# Patient Record
Sex: FEMALE | Race: BLACK OR AFRICAN AMERICAN | Employment: OTHER | URBAN - METROPOLITAN AREA
[De-identification: names, ages, dates, MRNs, and addresses within clinical notes are randomized per-mention and may not be internally consistent; named-entity substitution may affect disease eponyms.]

---

## 2017-01-17 ENCOUNTER — TELEPHONE (OUTPATIENT)
Dept: FAMILY MEDICINE CLINIC | Facility: CLINIC | Age: 57
End: 2017-01-17

## 2017-01-17 NOTE — TELEPHONE ENCOUNTER
Pt called and said she is having severe stomach issues, she would like to come in Friday mornign is possible with Dr. Patel Course.

## 2017-01-17 NOTE — TELEPHONE ENCOUNTER
Future Appointments  Date Time Provider Kane Molina   1/19/2017 10:00 AM Agatha Gambino,  [facility]

## 2017-01-17 NOTE — TELEPHONE ENCOUNTER
Patient states she is experiencing pain in her lower abdomen. She feels that this is related to the mesh used when she had hernia repair.   She states that she is having normal BM,some nausea but no vomiting  She only wants to see Dr Lisa Benjamin and she is hop

## 2017-01-19 ENCOUNTER — OFFICE VISIT (OUTPATIENT)
Dept: FAMILY MEDICINE CLINIC | Facility: CLINIC | Age: 57
End: 2017-01-19

## 2017-01-19 VITALS
SYSTOLIC BLOOD PRESSURE: 130 MMHG | HEART RATE: 78 BPM | BODY MASS INDEX: 34.45 KG/M2 | DIASTOLIC BLOOD PRESSURE: 76 MMHG | TEMPERATURE: 99 F | WEIGHT: 187.19 LBS | HEIGHT: 61.81 IN

## 2017-01-19 DIAGNOSIS — R10.829 REBOUND ABDOMINAL TENDERNESS: ICD-10-CM

## 2017-01-19 DIAGNOSIS — R19.8: ICD-10-CM

## 2017-01-19 DIAGNOSIS — R10.31 RIGHT LOWER QUADRANT ABDOMINAL PAIN: Primary | ICD-10-CM

## 2017-01-19 DIAGNOSIS — R11.0 NAUSEA: ICD-10-CM

## 2017-01-19 DIAGNOSIS — R10.30 ABDOMINAL WALL PAIN IN SUPRAPUBIC REGION: ICD-10-CM

## 2017-01-19 LAB
MULTISTIX LOT#: NORMAL NUMERIC
PH, URINE: 5.5 (ref 4.5–8)
SPECIFIC GRAVITY: 1.02 (ref 1–1.03)
UROBILINOGEN,SEMI-QN: 0.2 MG/DL (ref 0–1.9)

## 2017-01-19 PROCEDURE — 99214 OFFICE O/P EST MOD 30 MIN: CPT | Performed by: FAMILY MEDICINE

## 2017-01-19 PROCEDURE — 81003 URINALYSIS AUTO W/O SCOPE: CPT | Performed by: FAMILY MEDICINE

## 2017-01-19 NOTE — PROGRESS NOTES
Pratik Smith is a 61year old female. HPI:         Pelvic pain: points to suprapubic area for 4-5 days, 6 days at the most.  Marsha Stack is like a stabbing sensation. Associated with nausea but no vomiting. Started in the [place]", then moved down.   Ne 2/9/2013     possibly from Celebrex, see above    • History of hernia repair 4/29/2013   • Osteopenia 8/28/2013   • Osteoarthritis of right hip 8/28/2013   • Smoking addiction 9/15/2013   • Paresthesia and pain of both upper extremities 3/8/2014   • Histor kg/(m^2) as calculated from the following:    Height as of this encounter: 61.81\". Weight as of this encounter: 187 lb 3.2 oz. Physical Exam   Nursing note and vitals reviewed. Constitutional: She is oriented to person, place, and time.  She appears lower quadrant abdominal pain  Etiology uncertain, diverticulitis vs appendicitis vs other   - Urine Dip, auto without Micro  - CT ABDOMEN+PELVIS(CONTRAST ONLY)(CPT=74177); Future    2.  Abdominal wall pain in suprapubic region  As above in #1  - Urine Dip,

## 2017-01-24 ENCOUNTER — TELEPHONE (OUTPATIENT)
Dept: FAMILY MEDICINE CLINIC | Facility: CLINIC | Age: 57
End: 2017-01-24

## 2017-01-24 PROBLEM — R10.31 RIGHT LOWER QUADRANT ABDOMINAL PAIN: Status: ACTIVE | Noted: 2017-01-24

## 2017-01-24 PROBLEM — R10.829: Status: ACTIVE | Noted: 2017-01-24

## 2017-01-24 PROBLEM — R11.0 NAUSEA: Status: ACTIVE | Noted: 2017-01-24

## 2017-01-24 PROBLEM — R19.8: Status: ACTIVE | Noted: 2017-01-24

## 2017-01-24 PROBLEM — R10.30 ABDOMINAL WALL PAIN IN SUPRAPUBIC REGION: Status: ACTIVE | Noted: 2017-01-24

## 2017-01-25 NOTE — TELEPHONE ENCOUNTER
Spoke to patient and she states she is scheduled today at 2pm and has follow up appt on  Friday 1/27 with Dr Shawn Campos

## 2017-01-27 ENCOUNTER — OFFICE VISIT (OUTPATIENT)
Dept: FAMILY MEDICINE CLINIC | Facility: CLINIC | Age: 57
End: 2017-01-27

## 2017-01-27 VITALS
WEIGHT: 183.38 LBS | HEART RATE: 80 BPM | HEIGHT: 61.81 IN | DIASTOLIC BLOOD PRESSURE: 78 MMHG | SYSTOLIC BLOOD PRESSURE: 126 MMHG | BODY MASS INDEX: 33.75 KG/M2

## 2017-01-27 DIAGNOSIS — R10.31 RIGHT LOWER QUADRANT ABDOMINAL PAIN: ICD-10-CM

## 2017-01-27 DIAGNOSIS — I10 ESSENTIAL HYPERTENSION, BENIGN: Primary | ICD-10-CM

## 2017-01-27 DIAGNOSIS — N28.1 RENAL CYST, RIGHT: ICD-10-CM

## 2017-01-27 DIAGNOSIS — K63.9 BOWEL WALL THICKENING: ICD-10-CM

## 2017-01-27 PROBLEM — R11.0 NAUSEA: Status: RESOLVED | Noted: 2017-01-24 | Resolved: 2017-01-27

## 2017-01-27 PROBLEM — R10.829: Status: RESOLVED | Noted: 2017-01-24 | Resolved: 2017-01-27

## 2017-01-27 PROBLEM — R19.8: Status: RESOLVED | Noted: 2017-01-24 | Resolved: 2017-01-27

## 2017-01-27 PROBLEM — R10.30 ABDOMINAL WALL PAIN IN SUPRAPUBIC REGION: Status: RESOLVED | Noted: 2017-01-24 | Resolved: 2017-01-27

## 2017-01-27 PROCEDURE — 99214 OFFICE O/P EST MOD 30 MIN: CPT | Performed by: FAMILY MEDICINE

## 2017-01-27 RX ORDER — HYDROCHLOROTHIAZIDE 25 MG/1
25 TABLET ORAL EVERY MORNING
Qty: 90 TABLET | Refills: 1 | Status: SHIPPED | OUTPATIENT
Start: 2017-01-27 | End: 2017-05-15

## 2017-01-30 ENCOUNTER — TELEPHONE (OUTPATIENT)
Dept: FAMILY MEDICINE CLINIC | Facility: CLINIC | Age: 57
End: 2017-01-30

## 2017-01-30 DIAGNOSIS — R10.31 RIGHT LOWER QUADRANT ABDOMINAL PAIN: ICD-10-CM

## 2017-01-30 DIAGNOSIS — K86.9 PANCREATIC LESION: Primary | ICD-10-CM

## 2017-01-30 NOTE — TELEPHONE ENCOUNTER
Left message for patient to contact her insurance company for name of Dr that accepts her insurance.   Patient will call with name of provider once she has appt scheduled

## 2017-02-01 NOTE — PROGRESS NOTES
Ian Cooper is a 61year old female. HPI:     Right lower quadrant abdominal pain: Patient completed CT of abdomen and pelvis. The night over the night after having had a CT of abdomen and pelvis patient took over-the-counter magnesium citrate. repair 4/29/2013   • Osteopenia 8/28/2013   • Osteoarthritis of right hip 8/28/2013   • Smoking addiction 9/15/2013   • Paresthesia and pain of both upper extremities 3/8/2014   • History of fusion of cervical spine 3/8/2014   • Cervicalgia 3/27/2014   • L 126/78 mmHg  Pulse 80  Ht 61.81\"  Wt 183 lb 6.4 oz  BMI 33.75 kg/m2  GENERAL: NAD, pleasant, normal communication  SKIN: no visible rashes  HEAD: NCAT  NECK: supple, no adenopathy, no thyromegaly, no masses  LUNGS: CTA A/P no wheezes/ronchi/rales/crackles

## 2017-02-10 ENCOUNTER — TELEPHONE (OUTPATIENT)
Dept: FAMILY MEDICINE CLINIC | Facility: CLINIC | Age: 57
End: 2017-02-10

## 2017-02-10 DIAGNOSIS — K63.9 BOWEL WALL THICKENING: Primary | ICD-10-CM

## 2017-03-24 ENCOUNTER — APPOINTMENT (OUTPATIENT)
Dept: LAB | Age: 57
End: 2017-03-24
Attending: FAMILY MEDICINE
Payer: MEDICAID

## 2017-03-24 DIAGNOSIS — E55.9 VITAMIN D DEFICIENCY: ICD-10-CM

## 2017-03-24 LAB — 25-HYDROXYVITAMIN D (TOTAL): 18.3 NG/ML (ref 30–100)

## 2017-03-24 PROCEDURE — 36415 COLL VENOUS BLD VENIPUNCTURE: CPT

## 2017-03-24 PROCEDURE — 82306 VITAMIN D 25 HYDROXY: CPT

## 2017-04-04 ENCOUNTER — TELEPHONE (OUTPATIENT)
Dept: FAMILY MEDICINE CLINIC | Facility: CLINIC | Age: 57
End: 2017-04-04

## 2017-04-04 NOTE — TELEPHONE ENCOUNTER
Pt called and said she is going to have her colonoscopy done on 4/14/17 and she will have them forward all the results and information to Dr. Connie Arreola.

## 2017-05-15 ENCOUNTER — OFFICE VISIT (OUTPATIENT)
Dept: FAMILY MEDICINE CLINIC | Facility: CLINIC | Age: 57
End: 2017-05-15

## 2017-05-15 VITALS
DIASTOLIC BLOOD PRESSURE: 78 MMHG | HEART RATE: 80 BPM | BODY MASS INDEX: 34.26 KG/M2 | SYSTOLIC BLOOD PRESSURE: 112 MMHG | WEIGHT: 186.19 LBS | HEIGHT: 61.81 IN

## 2017-05-15 DIAGNOSIS — R10.32 ABDOMINAL PAIN, LEFT LOWER QUADRANT: ICD-10-CM

## 2017-05-15 DIAGNOSIS — R05.3 CHRONIC COUGH: ICD-10-CM

## 2017-05-15 DIAGNOSIS — R10.12 LEFT UPPER QUADRANT PAIN: Primary | ICD-10-CM

## 2017-05-15 DIAGNOSIS — F17.210 SMOKES CIGARETTES: ICD-10-CM

## 2017-05-15 DIAGNOSIS — I10 ESSENTIAL HYPERTENSION, BENIGN: ICD-10-CM

## 2017-05-15 PROCEDURE — 99214 OFFICE O/P EST MOD 30 MIN: CPT | Performed by: FAMILY MEDICINE

## 2017-05-15 RX ORDER — HYDROCHLOROTHIAZIDE 25 MG/1
25 TABLET ORAL EVERY MORNING
Qty: 90 TABLET | Refills: 1 | Status: SHIPPED | OUTPATIENT
Start: 2017-05-15 | End: 2018-02-02

## 2017-05-15 NOTE — PROGRESS NOTES
Jessica Ingram is a 61year old female. HPI:       Left sided abdominal pain for at least a couple of months. Needs a referral to follow up with GI. No changes in bowel movements.   Patient had EGD and colonoscopy was GI, patient states she was told b Chronic cough 11/14/2012   • Hypertension goal BP (blood pressure) < 130/80 11/14/2012   • Shoulder pain, left 1/25/2013   • Diffusion capacity of lung (dl), decreased 1/25/2013     severe    • Hematochezia 2/9/2013   • Rectal bleeding 2/9/2013   • Felice Carlisle Psychiatric/Behavioral: Negative. Negative for depressed mood. The patient is not nervous/anxious.         EXAM:   /78 mmHg  Pulse 80  Ht 61.81\"  Wt 186 lb 3.2 oz  BMI 34.27 kg/m2 Estimated body mass index is 34.27 kg/(m^2) as calculated from the Inhale 1 puff into the lungs every 4 to 6 hours as needed. Dispense: 1 Inhaler; Refill: 2    5. Chronic cough  As above in #4. - Ipratropium-Albuterol (COMBIVENT RESPIMAT)  MCG/ACT Inhalation Aero Soln;  Inhale 1 puff into the lungs every 4 to 6 wang

## 2017-05-15 NOTE — PATIENT INSTRUCTIONS
How to Quit Smoking  Smoking is one of the hardest habits to break. About half of all people who have ever smoked have been able to quit. Most people who still smoke want to quit. Here are some of the best ways to stop smoking.     Keep trying  It takes m After reviewing your smoking patterns and prior attempts to quit, your doctor may offer a prescription medicine such as bupropion, varenicline, a nicotine inhaler, or nasal spray. Each has advantages and side effects. Your doctor can review these with you. Quitting smoking is a gift to yourself, one of the best things you can do to keep your heart disease from getting worse. Smoking reduces oxygen flow to your heart by speeding the buildup of plaque and changing the health of your blood vessels.  This increas · Courtney Gong a list of  “quit benefits” in the spot where you smoke.  Put one on the refrigerator and one on your car dashboard.     For more information  · smokefree.gov/nhxv-dp-ig-expert  · [facility] Smoking Quitline: 877-44U-QUIT (987-706-7846)

## 2017-06-07 ENCOUNTER — OFFICE VISIT (OUTPATIENT)
Dept: FAMILY MEDICINE CLINIC | Facility: CLINIC | Age: 57
End: 2017-06-07

## 2017-06-07 VITALS
DIASTOLIC BLOOD PRESSURE: 82 MMHG | WEIGHT: 184.63 LBS | HEART RATE: 77 BPM | HEIGHT: 61.81 IN | RESPIRATION RATE: 16 BRPM | SYSTOLIC BLOOD PRESSURE: 132 MMHG | TEMPERATURE: 99 F | BODY MASS INDEX: 33.98 KG/M2

## 2017-06-07 DIAGNOSIS — N61.0 ACUTE MASTITIS OF LEFT BREAST: Primary | ICD-10-CM

## 2017-06-07 PROCEDURE — 99214 OFFICE O/P EST MOD 30 MIN: CPT | Performed by: FAMILY MEDICINE

## 2017-06-07 RX ORDER — AMOXICILLIN AND CLAVULANATE POTASSIUM 875; 125 MG/1; MG/1
1 TABLET, FILM COATED ORAL EVERY 12 HOURS
Qty: 20 TABLET | Refills: 0 | Status: SHIPPED | OUTPATIENT
Start: 2017-06-07 | End: 2017-06-17

## 2017-06-07 NOTE — PATIENT INSTRUCTIONS
Ignore the parts below that refer to lactation. Take an otc probiotic like Florastor while you are on the antibiotic. Or you could eat yogurt twice a day. Mastitis  Mastitis occurs when breast tissue becomes swollen and inflamed.  This is almost · Tell your healthcare provider if you have problems with breastfeeding. He or she may suggest changes to your technique, if needed. You may also be referred to a lactation nurse or consultant for support with breastfeeding.   General care  · Take any medic

## 2017-06-07 NOTE — PROGRESS NOTES
Hema Goddard is a 61year old female. HPI:       Breast Pain: Pt woke up Sat morning (4 days ago) and had left breast pain with brown discharge. She has a lump under the nipple, and she is getting a sharp pain going into the nipple.   She had this sa skin  Tramadol                Itching   Past Medical History   Diagnosis Date   • Fibroadenosis of breast    • Solitary cyst of breast    • Shoulder pain, right 9/23/2012   • Hip pain, right 9/23/2012   • Current smoker 11/14/2012   • Chronic cough 11/14/2 breath and wheezing. Cardiovascular: Negative for palpitations. Skin:        As in HPI   Neurological: Negative for headaches. Psychiatric/Behavioral: Negative.         EXAM:   /82 mmHg  Pulse 77  Temp(Src) 98.5 °F (36.9 °C) (Oral)  Resp 16  Ht both breasts were obtained.         BREAST COMPOSITION:   Heterogeneously dense, which could obscure small masses (51-75% glandular).      FINDINGS:  Overall breast parenchymal pattern is unremarkable with no suspicious finding.  Benign nodularity and calci

## 2017-06-26 ENCOUNTER — OFFICE VISIT (OUTPATIENT)
Dept: FAMILY MEDICINE CLINIC | Facility: CLINIC | Age: 57
End: 2017-06-26

## 2017-06-26 VITALS
RESPIRATION RATE: 16 BRPM | WEIGHT: 180.81 LBS | DIASTOLIC BLOOD PRESSURE: 82 MMHG | HEIGHT: 61.81 IN | BODY MASS INDEX: 33.27 KG/M2 | SYSTOLIC BLOOD PRESSURE: 130 MMHG | HEART RATE: 68 BPM

## 2017-06-26 DIAGNOSIS — M25.852 OTHER SPECIFIED JOINT DISORDERS, LEFT HIP: ICD-10-CM

## 2017-06-26 DIAGNOSIS — N61.0 ACUTE MASTITIS OF LEFT BREAST: Primary | ICD-10-CM

## 2017-06-26 PROCEDURE — 99213 OFFICE O/P EST LOW 20 MIN: CPT | Performed by: FAMILY MEDICINE

## 2017-06-26 NOTE — PROGRESS NOTES
Pt called and said that the mammogram order needed to be placed in the system. It looked like there was one already in there but per self scheduling? So orders re-submitted and pt aware.

## 2017-06-26 NOTE — PROGRESS NOTES
Reece Nino is a 61year old female. HPI:       Left Breast Pain:  Completed the abx last Sunday, 8 days ago. The pain has resolved. Feels her left hip is dislocating like the right one used to.   Throws her forward but is able to catch herself f cough 11/14/2012   • Current smoker 11/14/2012   • Diffusion capacity of lung (dl), decreased 1/25/2013    severe    • Fibroadenosis of breast    • Hematochezia 2/9/2013   • Hip pain, right 9/23/2012   • History of fusion of cervical spine 3/8/2014   • His Positive for gait problem. Negative for back pain and joint swelling. Neurological: Negative for weakness and numbness. Psychiatric/Behavioral: Negative. All other systems reviewed and are negative.       EXAM:   /82 (BP Location: Left arm, Geryl Ball

## 2017-07-28 ENCOUNTER — TELEPHONE (OUTPATIENT)
Dept: FAMILY MEDICINE CLINIC | Facility: CLINIC | Age: 57
End: 2017-07-28

## 2017-07-28 DIAGNOSIS — K63.9 BOWEL WALL THICKENING: Primary | ICD-10-CM

## 2017-07-28 NOTE — TELEPHONE ENCOUNTER
Patient states that she needs a referral to see Dr Bradley Gonzalez surgeon,for a f/u  Referral has been placed

## 2017-08-16 ENCOUNTER — TELEPHONE (OUTPATIENT)
Dept: FAMILY MEDICINE CLINIC | Facility: CLINIC | Age: 57
End: 2017-08-16

## 2017-08-16 DIAGNOSIS — Z00.00 PREVENTATIVE HEALTH CARE: Primary | ICD-10-CM

## 2017-08-16 NOTE — TELEPHONE ENCOUNTER
Radha from [address] [place] [address] is calling regarding the order for [employer], it is put in as Screening, the diagnosis does not require screening it should be under preventative for the insurance to pay for it.  Please look over order and see if it needs

## 2017-08-17 ENCOUNTER — HOSPITAL ENCOUNTER (OUTPATIENT)
Dept: MAMMOGRAPHY | Age: 57
Discharge: HOME | End: 2017-08-17
Attending: FAMILY MEDICINE
Payer: COMMERCIAL

## 2017-08-17 DIAGNOSIS — Z00.00 PREVENTATIVE HEALTH CARE: ICD-10-CM

## 2017-08-17 PROCEDURE — 77067 SCR MAMMO BI INCL CAD: CPT | Performed by: FAMILY MEDICINE

## 2017-08-21 ENCOUNTER — OFFICE VISIT (OUTPATIENT)
Dept: FAMILY MEDICINE CLINIC | Facility: CLINIC | Age: 57
End: 2017-08-21

## 2017-08-21 ENCOUNTER — LAB ENCOUNTER (OUTPATIENT)
Dept: LAB | Age: 57
End: 2017-08-21
Attending: FAMILY MEDICINE
Payer: COMMERCIAL

## 2017-08-21 VITALS
WEIGHT: 184 LBS | HEIGHT: 61.81 IN | BODY MASS INDEX: 33.86 KG/M2 | SYSTOLIC BLOOD PRESSURE: 132 MMHG | HEART RATE: 76 BPM | DIASTOLIC BLOOD PRESSURE: 78 MMHG

## 2017-08-21 DIAGNOSIS — Z00.00 ROUTINE GENERAL MEDICAL EXAMINATION AT A HEALTH CARE FACILITY: Primary | ICD-10-CM

## 2017-08-21 DIAGNOSIS — N61.0 ACUTE MASTITIS OF LEFT BREAST: ICD-10-CM

## 2017-08-21 DIAGNOSIS — Z00.00 LABORATORY EXAM ORDERED AS PART OF ROUTINE GENERAL MEDICAL EXAMINATION: ICD-10-CM

## 2017-08-21 DIAGNOSIS — F17.210 SMOKES CIGARETTES: ICD-10-CM

## 2017-08-21 DIAGNOSIS — H00.14 CHALAZION OF LEFT UPPER EYELID: ICD-10-CM

## 2017-08-21 LAB
25-HYDROXYVITAMIN D (TOTAL): 26.7 NG/ML (ref 30–100)
ALBUMIN SERPL-MCNC: 4.1 G/DL (ref 3.5–4.8)
ALP LIVER SERPL-CCNC: 63 U/L (ref 46–118)
ALT SERPL-CCNC: 23 U/L (ref 14–54)
AST SERPL-CCNC: 13 U/L (ref 15–41)
BASOPHILS # BLD AUTO: 0.04 X10(3) UL (ref 0–0.1)
BASOPHILS NFR BLD AUTO: 0.3 %
BILIRUB SERPL-MCNC: 0.4 MG/DL (ref 0.1–2)
BUN BLD-MCNC: 11 MG/DL (ref 8–20)
CALCIUM BLD-MCNC: 9.3 MG/DL (ref 8.3–10.3)
CHLORIDE: 104 MMOL/L (ref 101–111)
CHOLEST SMN-MCNC: 198 MG/DL (ref ?–200)
CO2: 28 MMOL/L (ref 22–32)
CREAT BLD-MCNC: 0.59 MG/DL (ref 0.55–1.02)
EOSINOPHIL # BLD AUTO: 0.16 X10(3) UL (ref 0–0.3)
EOSINOPHIL NFR BLD AUTO: 1.3 %
ERYTHROCYTE [DISTWIDTH] IN BLOOD BY AUTOMATED COUNT: 15.4 % (ref 11.5–16)
FREE T4: 1.2 NG/DL (ref 0.9–1.8)
GLUCOSE BLD-MCNC: 95 MG/DL (ref 70–99)
HCT VFR BLD AUTO: 42.9 % (ref 34–50)
HDLC SERPL-MCNC: 79 MG/DL (ref 45–?)
HDLC SERPL: 2.51 {RATIO} (ref ?–4.44)
HGB BLD-MCNC: 14 G/DL (ref 12–16)
IMMATURE GRANULOCYTE COUNT: 0.05 X10(3) UL (ref 0–1)
IMMATURE GRANULOCYTE RATIO %: 0.4 %
LDLC SERPL CALC-MCNC: 108 MG/DL (ref ?–130)
LDLC SERPL-MCNC: 11 MG/DL (ref 5–40)
LYMPHOCYTES # BLD AUTO: 4.6 X10(3) UL (ref 0.9–4)
LYMPHOCYTES NFR BLD AUTO: 36.2 %
M PROTEIN MFR SERPL ELPH: 7.7 G/DL (ref 6.1–8.3)
MCH RBC QN AUTO: 30.3 PG (ref 27–33.2)
MCHC RBC AUTO-ENTMCNC: 32.6 G/DL (ref 31–37)
MCV RBC AUTO: 92.9 FL (ref 81–100)
MONOCYTES # BLD AUTO: 0.67 X10(3) UL (ref 0.1–0.6)
MONOCYTES NFR BLD AUTO: 5.3 %
NEUTROPHIL ABS PRELIM: 7.19 X10 (3) UL (ref 1.3–6.7)
NEUTROPHILS # BLD AUTO: 7.19 X10(3) UL (ref 1.3–6.7)
NEUTROPHILS NFR BLD AUTO: 56.5 %
NONHDLC SERPL-MCNC: 119 MG/DL (ref ?–130)
PLATELET # BLD AUTO: 391 10(3)UL (ref 150–450)
POTASSIUM SERPL-SCNC: 3.8 MMOL/L (ref 3.6–5.1)
RBC # BLD AUTO: 4.62 X10(6)UL (ref 3.8–5.1)
RED CELL DISTRIBUTION WIDTH-SD: 52.1 FL (ref 35.1–46.3)
SODIUM SERPL-SCNC: 140 MMOL/L (ref 136–144)
TRIGLYCERIDES: 56 MG/DL (ref ?–150)
TSI SER-ACNC: 0.4 MIU/ML (ref 0.35–5.5)
WBC # BLD AUTO: 12.7 X10(3) UL (ref 4–13)

## 2017-08-21 PROCEDURE — 99396 PREV VISIT EST AGE 40-64: CPT | Performed by: FAMILY MEDICINE

## 2017-08-21 PROCEDURE — 36415 COLL VENOUS BLD VENIPUNCTURE: CPT | Performed by: FAMILY MEDICINE

## 2017-08-21 PROCEDURE — 82306 VITAMIN D 25 HYDROXY: CPT | Performed by: FAMILY MEDICINE

## 2017-08-21 PROCEDURE — 80061 LIPID PANEL: CPT | Performed by: FAMILY MEDICINE

## 2017-08-21 PROCEDURE — 80050 GENERAL HEALTH PANEL: CPT | Performed by: FAMILY MEDICINE

## 2017-08-21 PROCEDURE — 99213 OFFICE O/P EST LOW 20 MIN: CPT | Performed by: FAMILY MEDICINE

## 2017-08-21 PROCEDURE — 84439 ASSAY OF FREE THYROXINE: CPT | Performed by: FAMILY MEDICINE

## 2017-08-21 NOTE — PATIENT INSTRUCTIONS
The Benefits of Living Smoke Free  What do you want to gain from quitting? Check off some reasons to quit.   Health benefits  ___  Improve my ability to breathe without coughing or shortness of breath  ___  Reduce my risk of lung cancer, heart disease, ch Cigars and pipes are also dangerous. So are smokeless (chewing) tobacco and snuff. All of these products contain nicotine, a highly addictive substance that has harmful effects on your body.  Quitting smoking means giving up all tobacco products.      For m Mammogram: as often as your healthcare provider recommends if you are 22years old or older. When you should start having these exams and how often depends on your level of risk for breast cancer.  If your risk is high, your provider may recommend MRI scree Gonorrhea and syphilis tests: if you are at high risk for these infections, including if you have a new sex partner or more than 1 partner, a history of STDs, a partner with an STD, or a partner who is bisexual   HIV test: The CDC (Centers for Disease Cont Tetanus (Td) booster shot at least every 10 years. If you are under age 72, you should get the new Tdap booster to protect you better against whooping cough (pertussis) as well as tetanus.  If you are 72 or older, this new vaccine has not yet been approved Substance use: Don't use tobacco or illegal drugs. Avoid using alcohol while driving, swimming, boating, etc.   Diet and exercise: Try to maintain your weight at a comfortable, healthy level. Limit the fat and cholesterol in your diet.  Include a lot of who A chalazion usually lasts from a few weeks to a month. It often goes away on its own. A chalazion can be mistaken for a sty (infection of an oil gland) because they both appear on the eyelid.   Why a chalazion forms  It’s often unclear why a chalazion appea When to seek medical advice  Call your health care provider right away if any of these occur:  · Evelina Alford returns to the same area repeatedly  · Existing symptoms (such as pain, warmth, redness, and drainage) get worse  · New symptoms appear, such as eye

## 2017-08-21 NOTE — PROGRESS NOTES
HPI:   Vesna Salinas is a 61year old female who presents for a complete physical exam, follow up on mastitis, left eyelid problem, smoking. Symptoms: denies discharge, itching, burning or dysuria.    Last PAP: 2013   Last colonoscopy: 2011  Last mammo Medication side effect 2/9/2013    possibly from Celebrex, see above    • Osteoarthritis of right hip 8/28/2013   • Osteoarthrosis, unspecified whether generalized or localized, unspecified site    • Osteopenia 8/28/2013   • Paresthesia and pain of both up tightness on exertion: no edema  VASCULAR: denies leg cramps  GI: denies abdominal pain, bowel movement changes, blood in stool  : denies urinary problems, vaginal discharge or discomfort,  periods regular n/a  MUSCULOSKELETAL: denies joint pain or stiff examination without abnormal findings     VIEWS OBTAINED:  Routine views of both breasts were obtained. BREAST COMPOSITION:   Heterogeneously dense, which could obscure small masses (51-75% glandular).      FINDINGS:  Benign nodularity is identified b smoking and offered assistance. Pt has cut down but does not wish to quit altogether at this time. Return for Annual Wellness Visit and as needed or indicated. Self breast exam explained and advised to perform once a month.  Health

## 2017-08-24 ENCOUNTER — TELEPHONE (OUTPATIENT)
Dept: FAMILY MEDICINE CLINIC | Facility: CLINIC | Age: 57
End: 2017-08-24

## 2017-08-24 NOTE — TELEPHONE ENCOUNTER
[facility] is calling to let Dr Klarissa Reid know she needs a refill on her blood pressure medication sent to her [address].

## 2017-08-28 ENCOUNTER — TELEPHONE (OUTPATIENT)
Dept: FAMILY MEDICINE CLINIC | Facility: CLINIC | Age: 57
End: 2017-08-28

## 2017-08-28 DIAGNOSIS — K63.9 BOWEL WALL THICKENING: Primary | ICD-10-CM

## 2017-08-28 NOTE — TELEPHONE ENCOUNTER
Silvina Duval is calling to let the office know she needs a referral to Dr Luz Elena Gómez office for a f/u visit, she needs to see him for the CT results

## 2017-09-05 ENCOUNTER — TELEPHONE (OUTPATIENT)
Dept: FAMILY MEDICINE CLINIC | Facility: CLINIC | Age: 57
End: 2017-09-05

## 2017-09-05 NOTE — TELEPHONE ENCOUNTER
Sundeep Pradhan fell over the weekend, and she thinks she bruised her tail bone, she would like Dr Agnieszka Jacobson to look at it and let her know if anything else is wrong.  Please call her at [phone]

## 2017-09-06 NOTE — TELEPHONE ENCOUNTER
Pt called back and states she was sitting on her step stool and it was not secured and she fell and hit a bar on the stool and now her tailbone is very painful. Pt states she went to the ER but \"they were talking crazy\" she said so she left.   She has ri

## 2017-09-08 ENCOUNTER — TELEPHONE (OUTPATIENT)
Dept: FAMILY MEDICINE CLINIC | Facility: CLINIC | Age: 57
End: 2017-09-08

## 2017-09-08 ENCOUNTER — HOSPITAL ENCOUNTER (OUTPATIENT)
Dept: GENERAL RADIOLOGY | Age: 57
Discharge: HOME | End: 2017-09-08
Attending: FAMILY MEDICINE
Payer: COMMERCIAL

## 2017-09-08 ENCOUNTER — OFFICE VISIT (OUTPATIENT)
Dept: FAMILY MEDICINE CLINIC | Facility: CLINIC | Age: 57
End: 2017-09-08

## 2017-09-08 VITALS
SYSTOLIC BLOOD PRESSURE: 130 MMHG | DIASTOLIC BLOOD PRESSURE: 86 MMHG | BODY MASS INDEX: 34.08 KG/M2 | WEIGHT: 185.19 LBS | RESPIRATION RATE: 16 BRPM | HEART RATE: 72 BPM | HEIGHT: 61.81 IN
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
## 2017-09-08 DIAGNOSIS — M25.532 ACUTE PAIN OF LEFT WRIST: Primary | ICD-10-CM

## 2017-09-08 DIAGNOSIS — M53.3 SACRAL PAIN: ICD-10-CM

## 2017-09-08 DIAGNOSIS — M25.532 ACUTE PAIN OF LEFT WRIST: ICD-10-CM

## 2017-09-08 PROCEDURE — 72220 X-RAY EXAM SACRUM TAILBONE: CPT | Performed by: FAMILY MEDICINE

## 2017-09-08 PROCEDURE — 73110 X-RAY EXAM OF WRIST: CPT | Performed by: FAMILY MEDICINE

## 2017-09-08 PROCEDURE — 99214 OFFICE O/P EST MOD 30 MIN: CPT | Performed by: FAMILY MEDICINE

## 2017-09-08 NOTE — TELEPHONE ENCOUNTER
Pt informed of test results and recommendations to continue with heat and/or ice and to take tylenol as needed. Pt advised to call the office back if pain does not subside or get worse.   Pt verbalized understanding of all information given and had no ques

## 2017-09-08 NOTE — TELEPHONE ENCOUNTER
----- Message from Venessa Culp DO sent at 9/8/2017 12:53 PM CDT -----  Please call pt: no frx of wrist. Likely sprain and/or contusion of the wrist

## 2017-09-08 NOTE — TELEPHONE ENCOUNTER
----- Message from Rommel Gamez DO sent at 9/8/2017 12:52 PM CDT -----  Please call pt: no frx seen in the sacrum

## 2017-09-08 NOTE — PROGRESS NOTES
Fer Moreno is a 61year old female. HPI:       6 days ago was sitting on a collapsible step stool and her bottom hit the metal bar. Her bottom hit a metal bar and she reached down to brace herself. Pain in her bottom, points to sacrum.  6/10, pa lower extremity pain and palpitations             with chest tightness  Amlodipine              Itching    Comment:Side effects of blurry vision, dry mouth and itchy             skin  Tramadol                Itching   Past Medical History:   Diagnosis Date Years: 40.00        Types: Cigarettes  Smokeless tobacco: Never Used                      Comment: 4-5 cigarettes per day  Alcohol use: No                     REVIEW OF SYSTEMS:   Review of Systems   Constitutional: Negative. Respiratory: Negative.     C Future    2. Sacral pain  Likely contusion. Ok to use ice or heat. Ok to take Tylenol.    - XR SACRUM + COCCYX (MIN 2 VIEWS) (CPT=72220); Future    3.  Fall, initial encounter  Folding stool collapsed while pt was seated and leaning    - XR WRIST COMPLETE

## 2017-11-10 ENCOUNTER — OFFICE VISIT (OUTPATIENT)
Dept: FAMILY MEDICINE CLINIC | Facility: CLINIC | Age: 57
End: 2017-11-10

## 2017-11-10 VITALS
BODY MASS INDEX: 34.6 KG/M2 | DIASTOLIC BLOOD PRESSURE: 82 MMHG | HEART RATE: 76 BPM | RESPIRATION RATE: 16 BRPM | WEIGHT: 188 LBS | SYSTOLIC BLOOD PRESSURE: 130 MMHG | HEIGHT: 61.81 IN

## 2017-11-10 DIAGNOSIS — M53.3 SACROILIAC PAIN: ICD-10-CM

## 2017-11-10 DIAGNOSIS — F17.210 SMOKES CIGARETTES: ICD-10-CM

## 2017-11-10 DIAGNOSIS — M54.50 ACUTE RIGHT-SIDED LOW BACK PAIN WITHOUT SCIATICA: Primary | ICD-10-CM

## 2017-11-10 PROCEDURE — 99214 OFFICE O/P EST MOD 30 MIN: CPT | Performed by: FAMILY MEDICINE

## 2017-11-10 RX ORDER — METHYLPREDNISOLONE 4 MG/1
TABLET ORAL
Qty: 1 KIT | Refills: 0 | Status: SHIPPED | OUTPATIENT
Start: 2017-11-10 | End: 2018-02-02 | Stop reason: ALTCHOICE

## 2017-11-10 NOTE — PROGRESS NOTES
Nilson Malloy is a 61year old female. HPI:       Back pain:  Lower right. Pain up to 7-8 out of 10, it is 3-4 out of 10 at a lower level when more constant. When pain escalates it is a stabbing sensation when it is a lower level it is a soreness. Hypercholesterolemia     Lymphocytosis     Mass of arm     Chronic pain of right knee     Right lower quadrant abdominal pain     Renal cyst, right     Left upper quadrant pain     Abdominal pain, left lower quadrant     Acute mastitis of left breast     O COLONOSCOPY  04/25/2017: COLONOSCOPY  June 2013: HERNIA SURGERY  No date: HYSTERECTOMY      Comment: 1985  No date: [facility] NEEDLE LOCALIZATION W/ SPECIMEN 1 SITE RIG*      Comment: loc, 2012, benign  No date: NEEDLE BIOPSY RIGHT      Comment: 2012  No date: O is alert and oriented to person, place, and time. She displays normal reflexes. No motor deficit. Skin: Skin is warm and dry. Psychiatric: She has a normal mood and affect.  Her behavior is normal.       ASSESSMENT AND PLAN:     Acute right-sided low ba

## 2018-02-02 ENCOUNTER — TELEPHONE (OUTPATIENT)
Dept: FAMILY MEDICINE CLINIC | Facility: CLINIC | Age: 58
End: 2018-02-02

## 2018-02-02 ENCOUNTER — OFFICE VISIT (OUTPATIENT)
Dept: FAMILY MEDICINE CLINIC | Facility: CLINIC | Age: 58
End: 2018-02-02

## 2018-02-02 VITALS
HEIGHT: 61.81 IN | HEART RATE: 66 BPM | DIASTOLIC BLOOD PRESSURE: 64 MMHG | WEIGHT: 189 LBS | SYSTOLIC BLOOD PRESSURE: 132 MMHG | BODY MASS INDEX: 34.78 KG/M2

## 2018-02-02 DIAGNOSIS — M25.552 LEFT HIP PAIN: Primary | ICD-10-CM
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
## 2018-02-02 DIAGNOSIS — I10 ESSENTIAL HYPERTENSION, BENIGN: ICD-10-CM

## 2018-02-02 PROCEDURE — 99214 OFFICE O/P EST MOD 30 MIN: CPT | Performed by: FAMILY MEDICINE

## 2018-02-02 RX ORDER — HYDROCHLOROTHIAZIDE 25 MG/1
25 TABLET ORAL EVERY MORNING
Qty: 90 TABLET | Refills: 0 | Status: SHIPPED
Start: 2018-02-02 | End: 2018-04-12

## 2018-02-02 NOTE — PATIENT INSTRUCTIONS
-- tylenol and ibuprofen can help the pain  -- continue epsom salt soaks  -- continue stretching exercises  --limit anything that makes pain worse  --call to schedule xray  -- followup in 2-4 wks if not slowly improving

## 2018-02-02 NOTE — PROGRESS NOTES
Suleman Mckeon is a 61year old female here for Patient presents with:  Hip Pain: Left hip pain after patient fell at work on 01/11/2018       HPI:     Left hip pain  -started a couple weeks ago after a fall  -fell over crate on the ground, landed on le Comment: 1985 06/30/11: SPINE SURGERY PROCEDURE UNLISTED      Comment: \"neck surgery,\" [facility], [facility],                [address]: TOTAL ABDOM HYSTERECTOMY  04/25/2017: UPPER GI ENDOSCOPY PERFORMED   Family History   Problem Relation Age of On 1. Left hip pain  2.  Fall, initial encounter  -likely strain  -check xray to look at bones and joint space  -tylenol and ibuprofen prn  -c/w epsom salt soaks  -followup in 2-4 wks, sooner prn            Orders This Visit:  No orders of the defined type

## 2018-02-02 NOTE — TELEPHONE ENCOUNTER
[facility] forgot to let Dr Shea Hansen know at her visit today she needs a refill on her hydrochlorothiazide 25 MG Oral Tab sent to her [address] on [address]

## 2018-02-02 NOTE — TELEPHONE ENCOUNTER
From: Trell Alfonso  Sent: 2/2/2018 11:02 AM CST  Subject: Medication Renewal Request    Gurjit Howard.  Jim Bowers would like a refill of the following medications:     hydrochlorothiazide 25 MG Oral Tab Jeannine Cooper DO]    [facility]erred [facility]: [facility]

## 2018-03-02 ENCOUNTER — HOSPITAL ENCOUNTER (OUTPATIENT)
Dept: GENERAL RADIOLOGY | Age: 58
Discharge: HOME | End: 2018-03-02
Attending: FAMILY MEDICINE
Payer: MEDICAID

## 2018-03-02 DIAGNOSIS — M25.552 LEFT HIP PAIN: ICD-10-CM
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
## 2018-03-02 PROCEDURE — 73502 X-RAY EXAM HIP UNI 2-3 VIEWS: CPT | Performed by: FAMILY MEDICINE

## 2018-03-05 ENCOUNTER — TELEPHONE (OUTPATIENT)
Dept: FAMILY MEDICINE CLINIC | Facility: CLINIC | Age: 58
End: 2018-03-05

## 2018-03-05 NOTE — TELEPHONE ENCOUNTER
Per Dr. Shea Hansen, the patient was informed that her hip xray looks ok and that she should follow up if her pain is not improving as expected. The patient verbalized understanding and has no further questions at this time.

## 2018-03-05 NOTE — TELEPHONE ENCOUNTER
----- Message from Matthew Polo MD sent at 3/3/2018  8:11 AM CST -----  Hip xray looks ok  -followup if pain not improving as expected

## 2018-03-14 ENCOUNTER — OFFICE VISIT (OUTPATIENT)
Dept: FAMILY MEDICINE CLINIC | Facility: CLINIC | Age: 58
End: 2018-03-14

## 2018-03-14 VITALS
DIASTOLIC BLOOD PRESSURE: 82 MMHG | BODY MASS INDEX: 36.22 KG/M2 | SYSTOLIC BLOOD PRESSURE: 135 MMHG | WEIGHT: 196.81 LBS | HEART RATE: 63 BPM | HEIGHT: 61.81 IN | RESPIRATION RATE: 16 BRPM

## 2018-03-14 DIAGNOSIS — M25.552 ACUTE HIP PAIN, LEFT: Primary | ICD-10-CM

## 2018-03-14 DIAGNOSIS — M25.562 ACUTE PAIN OF LEFT KNEE: ICD-10-CM

## 2018-03-14 PROCEDURE — 99213 OFFICE O/P EST LOW 20 MIN: CPT | Performed by: FAMILY MEDICINE

## 2018-03-14 NOTE — PROGRESS NOTES
Pratik Smith is a 61year old female. HPI:       Susan Adame in Feb 2018. Tripped over a crate, went down on her knee and had immediate pain. The following weekend states she couldn't walk. Pain 8/10. Marsha Stack is an achiness or soreness.   Pain with standi skin  Tramadol                Itching   Past Medical History:   Diagnosis Date   • Cervicalgia 3/27/2014   • Chronic cough 11/14/2012   • Current smoker 11/14/2012   • Diffusion capacity of lung (dl), decreased 1/25/2013    severe    • Fibroadenosis o Comment: 4-5 cigarettes per day  Alcohol use: No                     REVIEW OF SYSTEMS:   Review of Systems   Constitutional: Negative. Respiratory: Negative. Cardiovascular: Negative.     Musculoskeletal:        As in HPI   Neurological:        Orpha Bustillos fracture, dislocation, deformity, or sign of AVN. No significant hip joint narrowing. No destructive   process. Note is made of degenerative changes at the L5-S1 level.   Dictated by: Rosalia Swann MD on 3/02/2018 at 10:27       Approved by: Echo Lira

## 2018-03-16 PROBLEM — M25.562 ACUTE PAIN OF LEFT KNEE: Status: ACTIVE | Noted: 2018-03-16

## 2018-03-16 PROBLEM — M25.552 ACUTE HIP PAIN, LEFT: Status: ACTIVE | Noted: 2018-03-16

## 2018-04-05 ENCOUNTER — OFFICE VISIT (OUTPATIENT)
Dept: PHYSICAL THERAPY | Age: 58
End: 2018-04-05
Attending: FAMILY MEDICINE
Payer: MEDICAID

## 2018-04-05 DIAGNOSIS — M25.562 ACUTE PAIN OF LEFT KNEE: ICD-10-CM

## 2018-04-05 DIAGNOSIS — M25.552 ACUTE HIP PAIN, LEFT: ICD-10-CM

## 2018-04-05 PROCEDURE — 97162 PT EVAL MOD COMPLEX 30 MIN: CPT | Performed by: PHYSICAL THERAPIST

## 2018-04-05 NOTE — PROGRESS NOTES
LOWER EXTREMITY EVALUATION:   Referring Physician: Dr. Hayden Clark  Diagnosis: Acute hip pain, left (M25.552), Acute pain of left knee (N05.613) Date of Service: 4/5/2018     PATIENT SUMMARY   Kathrin Warren is a 61year old y/o female who presents to ther tests:   Scour Test: R negative, L positive    Lumbar screen:           Lumbar ROM  Symptom Effect  Pretest symptoms lying: prone lying in extension increased left groin pain  Prone lying in left hip abd/ ER: decreased left groin pain 6-7/10  Prone lying i extension ROM to 0 deg to allow proper heel strike during gait and terminal knee extension in stance (8 visits)  · Pt will be independent and compliant with comprehensive HEP to maintain progress achieved in PT (8 visits)    Frequency / Duration: Patient w

## 2018-04-12 ENCOUNTER — OFFICE VISIT (OUTPATIENT)
Dept: FAMILY MEDICINE CLINIC | Facility: CLINIC | Age: 58
End: 2018-04-12

## 2018-04-12 ENCOUNTER — APPOINTMENT (OUTPATIENT)
Dept: PHYSICAL THERAPY | Age: 58
End: 2018-04-12
Attending: FAMILY MEDICINE
Payer: MEDICAID

## 2018-04-12 VITALS
SYSTOLIC BLOOD PRESSURE: 134 MMHG | BODY MASS INDEX: 35.88 KG/M2 | DIASTOLIC BLOOD PRESSURE: 80 MMHG | WEIGHT: 195 LBS | HEART RATE: 72 BPM | HEIGHT: 61.81 IN

## 2018-04-12 DIAGNOSIS — I10 ESSENTIAL HYPERTENSION, BENIGN: Primary | ICD-10-CM

## 2018-04-12 DIAGNOSIS — F17.210 SMOKES CIGARETTES: ICD-10-CM

## 2018-04-12 DIAGNOSIS — M25.552 ACUTE HIP PAIN, LEFT: ICD-10-CM

## 2018-04-12 PROCEDURE — 99213 OFFICE O/P EST LOW 20 MIN: CPT | Performed by: FAMILY MEDICINE

## 2018-04-12 RX ORDER — HYDROCHLOROTHIAZIDE 25 MG/1
25 TABLET ORAL EVERY MORNING
Qty: 30 TABLET | Refills: 5 | Status: SHIPPED | OUTPATIENT
Start: 2018-04-12 | End: 2018-08-25

## 2018-04-12 NOTE — PATIENT INSTRUCTIONS
Eating Heart-Healthy Foods  Eating has a big impact on your heart health. In fact, eating healthier can improve several of your heart risks at once. For instance, it helps you manage weight, cholesterol, and blood pressure.  Here are ideas to help you smooth Aim to make these foods staples of your diet. If you have diabetes, you may have different recommendations than what is listed here:  · Fruits and vegetables provide plenty of nutrients without a lot of calories.  At meals, fill half your plate with these f · Choose ingredients that spice up your food without adding calories, fat, or sodium. Try these items: horseradish, hot sauce, lemon, mustard, nonfat salad dressings, and vinegar.  For salt-free herbs and spices, try basil, cilantro, cinnamon, pepper, and r

## 2018-04-12 NOTE — PROGRESS NOTES
Jessica Ingram is a 61year old female. HPI:     Today:  Left hip pain:  Has been to PT for her assessment. Started doing HEP, it is painful. HTN:  Stable. Severity is mild, pt is on one agent to control her HTN. No SEs to the hctz noticed.     Sm Aspirin                     Comment:Burning & stabbing pain in her stomach  Losartan                Myalgia    Comment:Bilateral lower extremity pain and palpitations             with chest tightness  Amlodipine              Itching    Comment:Side eff Comment: Dr. Lawanda Maradiaga at [facility] AT [place]  04/25/2017: UPPER GI ENDOSCOPY PERFORMED   Social History:  Smoking status: Current Every Day Smoker                                                   Packs/day: 0.40      Years: 40.00        Types: Cigarettes  Smok COMPARISON:  [place], CT ABDOMEN+PELVIS(CONTRAST ONLY)(CPT=74177), 7/10/2014, 11:11. INDICATIONS:  W19. XXXA Unspecified fall, initial encounter M25.552 Pain in left hip     PATIENT STATED HISTORY: (As transcribed by Technologist)  Patient has pain

## 2018-04-19 ENCOUNTER — OFFICE VISIT (OUTPATIENT)
Dept: PHYSICAL THERAPY | Age: 58
End: 2018-04-19
Attending: FAMILY MEDICINE
Payer: MEDICAID

## 2018-04-19 PROCEDURE — 97110 THERAPEUTIC EXERCISES: CPT | Performed by: PHYSICAL THERAPIST

## 2018-04-19 NOTE — PROGRESS NOTES
Dx: Acute hip pain, left (M25.552), Acute pain of left knee (M25.562) Authorized # of Visits: 2/6 Next MD visit: none scheduled  Fall Risk: standard Precautions: n/a    Subjective: Patient reports left groin pain is constant but less when she lays on right Continue present management to achieve goals.     Skilled Services: MDT of the lumbar spine for symptom reduction, patient education    Charges: Jessica 3 (45 min)  Total Timed Treatment: 45 min Total Treatment Time: 45 min

## 2018-04-26 ENCOUNTER — OFFICE VISIT (OUTPATIENT)
Dept: PHYSICAL THERAPY | Age: 58
End: 2018-04-26
Attending: FAMILY MEDICINE
Payer: MEDICAID

## 2018-04-26 PROCEDURE — 97110 THERAPEUTIC EXERCISES: CPT | Performed by: PHYSICAL THERAPIST

## 2018-04-26 NOTE — PROGRESS NOTES
Dx: Acute hip pain, left (M25.552), Acute pain of left knee (M25.562)  Authorized # of Visits: 3/6  Next MD visit: none scheduled  Fall Risk: standard  Precautions: n/a    Subjective: Patient reports left groin pain is constant and increases with return fr self rotation mobilization in flexion and assess response (duration of relief).     Skilled Services: MDT of the lumbar spine for symptom reduction, patient education    Charges: Jessica 3 (45 min)  Total Timed Treatment: 45 min Total Treatment Time: 45 min

## 2018-05-03 ENCOUNTER — APPOINTMENT (OUTPATIENT)
Dept: PHYSICAL THERAPY | Age: 58
End: 2018-05-03
Attending: FAMILY MEDICINE
Payer: MEDICAID

## 2018-05-08 ENCOUNTER — APPOINTMENT (OUTPATIENT)
Dept: PHYSICAL THERAPY | Age: 58
End: 2018-05-08
Attending: FAMILY MEDICINE
Payer: MEDICAID

## 2018-05-10 ENCOUNTER — APPOINTMENT (OUTPATIENT)
Dept: PHYSICAL THERAPY | Age: 58
End: 2018-05-10
Attending: FAMILY MEDICINE
Payer: MEDICAID

## 2018-05-25 ENCOUNTER — TELEPHONE (OUTPATIENT)
Dept: FAMILY MEDICINE CLINIC | Facility: CLINIC | Age: 58
End: 2018-05-25

## 2018-05-25 DIAGNOSIS — I10 ESSENTIAL HYPERTENSION, BENIGN: ICD-10-CM

## 2018-05-25 NOTE — TELEPHONE ENCOUNTER
Adeola Johnson is calling to see if she can get a refill on her bloodpressure medication, she only has 1 pill left and she thought there were more refills, she would like it called into her [address] before 12 so she can have them deliver it to her.

## 2018-05-25 NOTE — TELEPHONE ENCOUNTER
Med was sent on 4/12/18 with 5 refills. Spoke to patient and she said she has just gotten a call from the pharmacy because they had made a mistake and thought she had no refills but discovered the additional refills.

## 2018-06-08 ENCOUNTER — TELEPHONE (OUTPATIENT)
Dept: FAMILY MEDICINE CLINIC | Facility: CLINIC | Age: 58
End: 2018-06-08

## 2018-06-08 ENCOUNTER — OFFICE VISIT (OUTPATIENT)
Dept: FAMILY MEDICINE CLINIC | Facility: CLINIC | Age: 58
End: 2018-06-08

## 2018-06-08 VITALS
DIASTOLIC BLOOD PRESSURE: 84 MMHG | BODY MASS INDEX: 35.37 KG/M2 | HEIGHT: 61.81 IN | SYSTOLIC BLOOD PRESSURE: 132 MMHG | RESPIRATION RATE: 16 BRPM | HEART RATE: 76 BPM | WEIGHT: 192.19 LBS

## 2018-06-08 DIAGNOSIS — M25.552 CHRONIC LEFT HIP PAIN: Primary | ICD-10-CM

## 2018-06-08 DIAGNOSIS — G89.29 CHRONIC LEFT HIP PAIN: Primary | ICD-10-CM

## 2018-06-08 PROCEDURE — 99213 OFFICE O/P EST LOW 20 MIN: CPT | Performed by: FAMILY MEDICINE

## 2018-06-08 NOTE — PROGRESS NOTES
Vesna Salinas is a 61year old female. HPI:       Left Hip pain:  Almost 12 months. Fell twice. Feels like it's coming out of the socket. Pain up to 10/10. Rojas Capuchin is a throbbing. Hears and feels popping and clicking.   Similar to the hip pain s of breast       Past Surgical History:  2011: COLONOSCOPY  04/25/2017: COLONOSCOPY  June 2013: HERNIA SURGERY  No date: HYSTERECTOMY      Comment: 1985  No date: [facility] NEEDLE LOCALIZATION W/ SPECIMEN 1 SITE RIG*      Comment: loc, 2012, benign  No date: NEED thought d/o    ASSESSMENT AND PLAN:       Chronic left hip pain  (primary encounter diagnosis)    1. Chronic left hip pain  OTC analgesics as needed. Patient to call and let us know the name of the orthopedic surgeon that she needs a referral for.

## 2018-06-08 NOTE — TELEPHONE ENCOUNTER
Please clarify, the name of the surgeon that did her Hip replacement is Dr. Lexx Hatfield. Also, is Ally his current location or where she last saw him for her left hip replacement.

## 2018-06-08 NOTE — TELEPHONE ENCOUNTER
Joe Mcintosh is calling to give Dr Connie Arreola information on the surgeon that did surgery on her Knee 3 yrs ago, his name is Dr Nikki Mina in [facility].

## 2018-06-09 PROBLEM — G89.29 CHRONIC LEFT HIP PAIN: Status: ACTIVE | Noted: 2018-06-09

## 2018-06-09 PROBLEM — M25.552 CHRONIC LEFT HIP PAIN: Status: ACTIVE | Noted: 2018-06-09

## 2018-06-10 NOTE — PATIENT INSTRUCTIONS
How Your Hip Works  The hip joint is one of the body’s largest weight-bearing joints. It’s a ball-and-socket joint. This helps the hip remain stable even during twisting and extreme ranges of motion.  A healthy hip joint allows you to walk, squat, and tur

## 2018-06-11 NOTE — TELEPHONE ENCOUNTER
Spoke to patient and she said that [facility] current location. Last time she saw Dr. Christal Gutierrez was in [place] and she then got a letter from that office that he would be moving his practice to [facility] and she plans on staying with this provider.

## 2018-08-24 ENCOUNTER — OFFICE VISIT (OUTPATIENT)
Dept: FAMILY MEDICINE CLINIC | Facility: CLINIC | Age: 58
End: 2018-08-24
Payer: MEDICAID

## 2018-08-24 VITALS
RESPIRATION RATE: 16 BRPM | HEIGHT: 61.81 IN | SYSTOLIC BLOOD PRESSURE: 134 MMHG | DIASTOLIC BLOOD PRESSURE: 88 MMHG | WEIGHT: 195.38 LBS | HEART RATE: 83 BPM | BODY MASS INDEX: 35.95 KG/M2

## 2018-08-24 DIAGNOSIS — E55.9 VITAMIN D DEFICIENCY: ICD-10-CM

## 2018-08-24 DIAGNOSIS — Z00.00 LABORATORY EXAM ORDERED AS PART OF ROUTINE GENERAL MEDICAL EXAMINATION: ICD-10-CM

## 2018-08-24 DIAGNOSIS — Z00.00 ROUTINE GENERAL MEDICAL EXAMINATION AT A HEALTH CARE FACILITY: Primary | ICD-10-CM

## 2018-08-24 DIAGNOSIS — N39.3 URINARY, INCONTINENCE, STRESS FEMALE: ICD-10-CM

## 2018-08-24 DIAGNOSIS — Z12.31 ENCOUNTER FOR SCREENING MAMMOGRAM FOR MALIGNANT NEOPLASM OF BREAST: ICD-10-CM

## 2018-08-24 PROCEDURE — 99396 PREV VISIT EST AGE 40-64: CPT | Performed by: FAMILY MEDICINE

## 2018-08-24 NOTE — PROGRESS NOTES
HPI:   Jimmy Campos is a 61year old female who presents for her annual wellness visit. Symptoms: denies discharge, itching, burning or dysuria.    Sexually active: no   Last colonoscopy: utd  Last mammogram: 8/2017        Wt Readings from Last 6 Enc decreased 1/25/2013    severe    • Fibroadenosis of breast    • Hematochezia 2/9/2013   • Hip pain, right 9/23/2012   • History of fusion of cervical spine 3/8/2014   • History of hernia repair 4/29/2013   • History of umbilical hernia repair 9/96/2451   • single. Children: one. Exercise: was physically active at work, currently squats and leg lifts.     Diet: watches minimally     REVIEW OF SYSTEMS:   GENERAL: denies fevers, weakness, trouble sleeping or weight changes  SKIN: denies any unusual skin lesion affect          ASSESSMENT AND PLAN:   Trell Alfonso is a 61year old female who presents for a complete physical exam.        Routine general medical examination at a health care facility  (primary encounter diagnosis)  Urinary, incontinence, stress f 1,500 mg daily. Lifestyle guidance provided recommended low fat diet and aerobic exercise 30 minutes 3-4 times weekly. The patient indicates understanding of these issues and agrees to the plan.   The patient is asked to return for complete physical yearl

## 2018-08-25 DIAGNOSIS — I10 ESSENTIAL HYPERTENSION, BENIGN: ICD-10-CM

## 2018-08-27 RX ORDER — HYDROCHLOROTHIAZIDE 25 MG/1
25 TABLET ORAL EVERY MORNING
Qty: 30 TABLET | Refills: 2 | Status: SHIPPED
Start: 2018-08-27 | End: 2018-11-02

## 2018-08-27 NOTE — TELEPHONE ENCOUNTER
From: Adi Lunsford  Sent: 8/25/2018 12:26 PM CDT  Subject: Medication Renewal Request    Octavia Khanna.  Fady Norwood would like a refill of the following medications:     hydrochlorothiazide 25 MG Oral Tab Meryl Govea, ]   Patient Comment: Forgot to mention

## 2018-08-31 ENCOUNTER — LABORATORY ENCOUNTER (OUTPATIENT)
Dept: LAB | Age: 58
End: 2018-08-31
Attending: FAMILY MEDICINE
Payer: MEDICAID

## 2018-08-31 DIAGNOSIS — E55.9 VITAMIN D DEFICIENCY: ICD-10-CM

## 2018-08-31 DIAGNOSIS — Z00.00 LABORATORY EXAM ORDERED AS PART OF ROUTINE GENERAL MEDICAL EXAMINATION: ICD-10-CM

## 2018-08-31 LAB
ALBUMIN SERPL-MCNC: 4.1 G/DL (ref 3.5–4.8)
ALBUMIN/GLOB SERPL: 1.1 {RATIO} (ref 1–2)
ALP LIVER SERPL-CCNC: 53 U/L (ref 46–118)
ALT SERPL-CCNC: 24 U/L (ref 14–54)
ANION GAP SERPL CALC-SCNC: 8 MMOL/L (ref 0–18)
AST SERPL-CCNC: 18 U/L (ref 15–41)
BASOPHILS # BLD AUTO: 0.06 X10(3) UL (ref 0–0.1)
BASOPHILS NFR BLD AUTO: 0.5 %
BILIRUB SERPL-MCNC: 0.5 MG/DL (ref 0.1–2)
BUN BLD-MCNC: 12 MG/DL (ref 8–20)
BUN/CREAT SERPL: 18.5 (ref 10–20)
CALCIUM BLD-MCNC: 9.3 MG/DL (ref 8.3–10.3)
CHLORIDE SERPL-SCNC: 103 MMOL/L (ref 101–111)
CHOLEST SMN-MCNC: 242 MG/DL (ref ?–200)
CO2 SERPL-SCNC: 28 MMOL/L (ref 22–32)
CREAT BLD-MCNC: 0.65 MG/DL (ref 0.55–1.02)
EOSINOPHIL # BLD AUTO: 0.11 X10(3) UL (ref 0–0.3)
EOSINOPHIL NFR BLD AUTO: 0.9 %
ERYTHROCYTE [DISTWIDTH] IN BLOOD BY AUTOMATED COUNT: 15.3 % (ref 11.5–16)
GLOBULIN PLAS-MCNC: 3.7 G/DL (ref 2.5–4)
GLUCOSE BLD-MCNC: 84 MG/DL (ref 70–99)
HCT VFR BLD AUTO: 44.3 % (ref 34–50)
HDLC SERPL-MCNC: 83 MG/DL (ref 40–59)
HGB BLD-MCNC: 14.1 G/DL (ref 12–16)
IMMATURE GRANULOCYTE COUNT: 0.06 X10(3) UL (ref 0–1)
IMMATURE GRANULOCYTE RATIO %: 0.5 %
LDLC SERPL CALC-MCNC: 138 MG/DL (ref ?–100)
LYMPHOCYTES # BLD AUTO: 4.61 X10(3) UL (ref 0.9–4)
LYMPHOCYTES NFR BLD AUTO: 37.1 %
M PROTEIN MFR SERPL ELPH: 7.8 G/DL (ref 6.1–8.3)
MCH RBC QN AUTO: 30.5 PG (ref 27–33.2)
MCHC RBC AUTO-ENTMCNC: 31.8 G/DL (ref 31–37)
MCV RBC AUTO: 95.7 FL (ref 81–100)
MONOCYTES # BLD AUTO: 0.77 X10(3) UL (ref 0.1–1)
MONOCYTES NFR BLD AUTO: 6.2 %
NEUTROPHIL ABS PRELIM: 6.81 X10 (3) UL (ref 1.3–6.7)
NEUTROPHILS # BLD AUTO: 6.81 X10(3) UL (ref 1.3–6.7)
NEUTROPHILS NFR BLD AUTO: 54.8 %
NONHDLC SERPL-MCNC: 159 MG/DL (ref ?–130)
OSMOLALITY SERPL CALC.SUM OF ELEC: 287 MOSM/KG (ref 275–295)
PLATELET # BLD AUTO: 433 10(3)UL (ref 150–450)
POTASSIUM SERPL-SCNC: 3.6 MMOL/L (ref 3.6–5.1)
RBC # BLD AUTO: 4.63 X10(6)UL (ref 3.8–5.1)
RED CELL DISTRIBUTION WIDTH-SD: 53.7 FL (ref 35.1–46.3)
SODIUM SERPL-SCNC: 139 MMOL/L (ref 136–144)
T4 FREE SERPL-MCNC: 1.2 NG/DL (ref 0.9–1.8)
TRIGL SERPL-MCNC: 104 MG/DL (ref 30–149)
TSI SER-ACNC: 0.56 MIU/ML (ref 0.35–5.5)
VIT D+METAB SERPL-MCNC: 20.4 NG/ML (ref 30–100)
VLDLC SERPL CALC-MCNC: 21 MG/DL (ref 0–30)
WBC # BLD AUTO: 12.4 X10(3) UL (ref 4–13)

## 2018-08-31 PROCEDURE — 84443 ASSAY THYROID STIM HORMONE: CPT

## 2018-08-31 PROCEDURE — 82306 VITAMIN D 25 HYDROXY: CPT

## 2018-08-31 PROCEDURE — 80053 COMPREHEN METABOLIC PANEL: CPT

## 2018-08-31 PROCEDURE — 36415 COLL VENOUS BLD VENIPUNCTURE: CPT

## 2018-08-31 PROCEDURE — 85025 COMPLETE CBC W/AUTO DIFF WBC: CPT

## 2018-08-31 PROCEDURE — 84439 ASSAY OF FREE THYROXINE: CPT

## 2018-08-31 PROCEDURE — 80061 LIPID PANEL: CPT

## 2018-09-05 ENCOUNTER — HOSPITAL ENCOUNTER (OUTPATIENT)
Dept: MAMMOGRAPHY | Age: 58
Discharge: HOME | End: 2018-09-05
Attending: FAMILY MEDICINE
Payer: MEDICAID

## 2018-09-05 DIAGNOSIS — Z12.31 ENCOUNTER FOR SCREENING MAMMOGRAM FOR MALIGNANT NEOPLASM OF BREAST: ICD-10-CM

## 2018-09-05 PROCEDURE — 77067 SCR MAMMO BI INCL CAD: CPT | Performed by: FAMILY MEDICINE

## 2018-09-05 PROCEDURE — 77063 BREAST TOMOSYNTHESIS BI: CPT | Performed by: FAMILY MEDICINE

## 2018-10-31 ENCOUNTER — OFFICE VISIT (OUTPATIENT)
Dept: FAMILY MEDICINE CLINIC | Facility: CLINIC | Age: 58
End: 2018-10-31
Payer: MEDICAID

## 2018-10-31 VITALS
HEART RATE: 67 BPM | SYSTOLIC BLOOD PRESSURE: 130 MMHG | BODY MASS INDEX: 35.7 KG/M2 | WEIGHT: 194 LBS | HEIGHT: 62 IN | DIASTOLIC BLOOD PRESSURE: 90 MMHG

## 2018-10-31 DIAGNOSIS — M25.352 HIP INSTABILITY, LEFT: ICD-10-CM

## 2018-10-31 DIAGNOSIS — G89.29 CHRONIC LEFT HIP PAIN: Primary | ICD-10-CM

## 2018-10-31 DIAGNOSIS — M25.552 CHRONIC LEFT HIP PAIN: Primary | ICD-10-CM

## 2018-10-31 PROCEDURE — 99213 OFFICE O/P EST LOW 20 MIN: CPT | Performed by: FAMILY MEDICINE

## 2018-10-31 NOTE — PROGRESS NOTES
Denver Lawman is a 60year old female. HPI:     Hip pain:  X several months. Left hip gives out on her. Has fallen 3 times over the last month. Has pain when it gives out on her. Also has pain when sleeping at night, interferes with sleep.   Poi • Solitary cyst of breast       Past Surgical History:   Procedure Laterality Date   • COLONOSCOPY  2011   • COLONOSCOPY  04/25/2017   • ENDOSCOPIC ULTRASOUND (EUS) N/A 9/30/2014    Performed by Lauren Wilkins MD at [address] [address] bowel sounds, NT/ND, no pulsations, no r/r/g, no masses, no HSM  EXTREMITIES: pain reproduced with flexion and internal rotation of femur.   NEURO: Alert and Oriented x3, CN II-XII grossly intact, no focal weakness  PSYCH: affect normal, normal thought cont

## 2018-11-02 ENCOUNTER — TELEPHONE (OUTPATIENT)
Dept: FAMILY MEDICINE CLINIC | Facility: CLINIC | Age: 58
End: 2018-11-02

## 2018-11-02 DIAGNOSIS — I10 ESSENTIAL HYPERTENSION, BENIGN: ICD-10-CM

## 2018-11-02 RX ORDER — HYDROCHLOROTHIAZIDE 25 MG/1
25 TABLET ORAL EVERY MORNING
Qty: 30 TABLET | Refills: 2 | Status: SHIPPED | OUTPATIENT
Start: 2018-11-02 | End: 2019-02-20

## 2018-11-02 NOTE — TELEPHONE ENCOUNTER
Tammy Vergara is calling to let Dr Jodi Villarreal know she needs a refill on her blood pressure medication to go to her [facility].

## 2018-11-27 ENCOUNTER — HOSPITAL ENCOUNTER (OUTPATIENT)
Dept: MRI IMAGING | Age: 58
Discharge: HOME | End: 2018-11-27
Attending: FAMILY MEDICINE
Payer: MEDICAID

## 2018-11-27 DIAGNOSIS — G89.29 CHRONIC LEFT HIP PAIN: ICD-10-CM

## 2018-11-27 DIAGNOSIS — F17.210 SMOKES CIGARETTES: ICD-10-CM

## 2018-11-27 DIAGNOSIS — M25.352 HIP INSTABILITY, LEFT: ICD-10-CM

## 2018-11-27 DIAGNOSIS — R05.3 CHRONIC COUGH: ICD-10-CM

## 2018-11-27 DIAGNOSIS — M25.552 CHRONIC LEFT HIP PAIN: ICD-10-CM

## 2018-11-27 PROCEDURE — 73721 MRI JNT OF LWR EXTRE W/O DYE: CPT | Performed by: FAMILY MEDICINE

## 2018-11-28 RX ORDER — IPRATROPIUM BROMIDE AND ALBUTEROL 20; 100 UG/1; UG/1
SPRAY, METERED RESPIRATORY (INHALATION)
Qty: 4 G | Refills: 0 | Status: SHIPPED | OUTPATIENT
Start: 2018-11-28 | End: 2019-01-15

## 2018-11-30 ENCOUNTER — TELEPHONE (OUTPATIENT)
Dept: FAMILY MEDICINE CLINIC | Facility: CLINIC | Age: 58
End: 2018-11-30

## 2018-11-30 NOTE — TELEPHONE ENCOUNTER
----- Message from Helder Navarro DO sent at 11/28/2018  4:58 PM CST -----  Please call patient: MRI of left hip exam is listed as an incomplete examination, patient was in pain during the MRI and there is degradation of the images.   However there were s

## 2018-11-30 NOTE — TELEPHONE ENCOUNTER
Spoke to patient with results/instructions and did give her the number to [id number] [phone] if she would like to try and change ortho providers.   Pt verbalized understanding

## 2018-12-24 ENCOUNTER — MED REC SCAN ONLY (OUTPATIENT)
Dept: FAMILY MEDICINE CLINIC | Facility: CLINIC | Age: 58
End: 2018-12-24

## 2019-01-02 ENCOUNTER — TELEPHONE (OUTPATIENT)
Dept: FAMILY MEDICINE CLINIC | Facility: CLINIC | Age: 59
End: 2019-01-02

## 2019-01-02 DIAGNOSIS — M25.552 LEFT HIP PAIN: Primary | ICD-10-CM

## 2019-01-04 NOTE — TELEPHONE ENCOUNTER
Jose Mccullough is calling to let the office know she did call [facility], they told her Dr Viridiana Motley needs to send them a referral before she can make a appt.  Please call Jose Mccullough with any questions [phone]

## 2019-01-15 DIAGNOSIS — F17.210 SMOKES CIGARETTES: ICD-10-CM

## 2019-01-15 DIAGNOSIS — R05.3 CHRONIC COUGH: ICD-10-CM

## 2019-01-15 RX ORDER — IPRATROPIUM/ALBUTEROL SULFATE 20-100 MCG
MIST INHALER (GRAM) INHALATION
Qty: 4 G | Refills: 0 | Status: SHIPPED | OUTPATIENT
Start: 2019-01-15 | End: 2019-06-28

## 2019-01-24 ENCOUNTER — TELEPHONE (OUTPATIENT)
Dept: FAMILY MEDICINE CLINIC | Facility: CLINIC | Age: 59
End: 2019-01-24

## 2019-01-24 NOTE — TELEPHONE ENCOUNTER
Pt called and said she needs a written statement for a raised toilet due to her left hip issue. It needs to be faxed to Steffen Hickey [phone]. Make attention to Justin Silva [phone].

## 2019-02-04 ENCOUNTER — TELEPHONE (OUTPATIENT)
Dept: NEUROLOGY | Facility: CLINIC | Age: 59
End: 2019-02-04

## 2019-02-04 NOTE — TELEPHONE ENCOUNTER
Pt rescheduled her 2/4 NP appt with Dr Fernandez Morley for 2/13 with Dr Fernandez Morley due to transportation issues

## 2019-02-13 ENCOUNTER — TELEPHONE (OUTPATIENT)
Dept: PAIN CLINIC | Facility: CLINIC | Age: 59
End: 2019-02-13

## 2019-02-13 ENCOUNTER — OFFICE VISIT (OUTPATIENT)
Dept: PAIN CLINIC | Facility: CLINIC | Age: 59
End: 2019-02-13
Payer: MEDICAID

## 2019-02-13 VITALS
WEIGHT: 190 LBS | BODY MASS INDEX: 34.96 KG/M2 | HEIGHT: 62 IN | SYSTOLIC BLOOD PRESSURE: 140 MMHG | DIASTOLIC BLOOD PRESSURE: 80 MMHG

## 2019-02-13 DIAGNOSIS — M16.12 PRIMARY OSTEOARTHRITIS OF LEFT HIP: Primary | ICD-10-CM

## 2019-02-13 DIAGNOSIS — M16.12 OSTEOARTHRITIS OF LEFT HIP, UNSPECIFIED OSTEOARTHRITIS TYPE: Primary | ICD-10-CM

## 2019-02-13 PROCEDURE — 99204 OFFICE O/P NEW MOD 45 MIN: CPT | Performed by: ANESTHESIOLOGY

## 2019-02-13 NOTE — PROGRESS NOTES
HPI:    Patient ID: Merlin Lichtenstein is a 60year old female.     HPI    Review of Systems         Current Outpatient Medications:  COMBIVENT RESPIMAT  MCG/ACT Inhalation Aero Soln INHALE ONE PUFF BY MOUTH EVERY 4 TO 6 HOURS AS NEEDED Disp: 4 g Rfl:

## 2019-02-13 NOTE — TELEPHONE ENCOUNTER
Medical clearance needed- no    Pt seen in OV today by Dr. Escamilla Later and recommended for hip joint (X 1). Please begin PA process for procedure(s).      Laterality: left   Level(s): hip joint    Pt informed callback will be given when PA feedback received and p

## 2019-02-19 NOTE — TELEPHONE ENCOUNTER
This code  does not go through [address].   Called [facility] COMM spoke with Del Johnson - no PA needed   Call reference [id number]  Call time 17:11

## 2019-02-19 NOTE — TELEPHONE ENCOUNTER
Called patient and scheduled for:    Appointment Date:  3/5/19  Procedure Time:  1000 am  Check-in Time:  900 am    Pre-procedure instructions reviewed with patient in detail. Patient verbalized understanding.

## 2019-02-19 NOTE — PROGRESS NOTES
Name: Ian Cooper   : 11/3/1960   DOS: 2019     Chief complaint: Left hip pain. History of present illness: Ian Cooper is a 60year old female complaining of  pain in the left hip for 2 years.   Pain started from without any injury breast         Current Outpatient Medications:  COMBIVENT RESPIMAT  MCG/ACT Inhalation Aero Soln INHALE ONE PUFF BY MOUTH EVERY 4 TO 6 HOURS AS NEEDED Disp: 4 g Rfl: 0   hydrochlorothiazide 25 MG Oral Tab Take 1 tablet (25 mg total) by mouth every mo coughing. Gastrointestinal:  Denies abdominal pain, nausea, vomiting, constipation, diarrhea, symptoms of GI bleeding. Neurologic: Negative.  Specifically denies any fainting, head injury, LOC, weakness, tremor, headaches, seizures, speech disorders, loss Examination:    R   L   C5:     N   N   C6:     N   N   C7:     N   N   C8:     N   N   T1:     N   N    Cardiovascular system: Regular rate and rhythm. S1, S2 normal. No murmurs. Respiratory system: Breath sounds equal bilaterally. No crackles, rhonchi. of the procedure were discussed with the patient. The patient wanted to proceed with the procedure. Thank you very much for referring the patient to me. Kobe Arellano M.D.   Pain Management

## 2019-02-20 DIAGNOSIS — I10 ESSENTIAL HYPERTENSION, BENIGN: ICD-10-CM

## 2019-02-20 RX ORDER — HYDROCHLOROTHIAZIDE 25 MG/1
TABLET ORAL
Qty: 30 TABLET | Refills: 1 | Status: SHIPPED | OUTPATIENT
Start: 2019-02-20 | End: 2019-04-26

## 2019-02-28 ENCOUNTER — TELEPHONE (OUTPATIENT)
Dept: SURGERY | Facility: CLINIC | Age: 59
End: 2019-02-28

## 2019-02-28 NOTE — TELEPHONE ENCOUNTER
Spoke to patient, confirmed procedure date of 3/5/19 and to be checked in at outpatient registration at 9:00 am. Patient instructed to call pre-procedure line before procedure at [phone].  Patient instructed to call office if there are additional quest

## 2019-03-05 ENCOUNTER — APPOINTMENT (OUTPATIENT)
Dept: GENERAL RADIOLOGY | Facility: HOSPITAL | Age: 59
End: 2019-03-05
Attending: ANESTHESIOLOGY
Payer: MEDICAID

## 2019-03-05 ENCOUNTER — HOSPITAL ENCOUNTER (OUTPATIENT)
Facility: HOSPITAL | Age: 59
Setting detail: HOSPITAL OUTPATIENT SURGERY
Discharge: HOME | End: 2019-03-05
Attending: ANESTHESIOLOGY | Admitting: ANESTHESIOLOGY
Payer: MEDICAID

## 2019-03-05 VITALS
SYSTOLIC BLOOD PRESSURE: 162 MMHG | TEMPERATURE: 98 F | OXYGEN SATURATION: 97 % | RESPIRATION RATE: 18 BRPM | DIASTOLIC BLOOD PRESSURE: 67 MMHG | HEART RATE: 64 BPM

## 2019-03-05 DIAGNOSIS — M16.12 OSTEOARTHRITIS OF LEFT HIP, UNSPECIFIED OSTEOARTHRITIS TYPE: ICD-10-CM

## 2019-03-05 PROCEDURE — 3E0U33Z INTRODUCTION OF ANTI-INFLAMMATORY INTO JOINTS, PERCUTANEOUS APPROACH: ICD-10-PCS | Performed by: ANESTHESIOLOGY

## 2019-03-05 PROCEDURE — 77002 NEEDLE LOCALIZATION BY XRAY: CPT | Performed by: ANESTHESIOLOGY

## 2019-03-05 PROCEDURE — 3E0U3BZ INTRODUCTION OF ANESTHETIC AGENT INTO JOINTS, PERCUTANEOUS APPROACH: ICD-10-PCS | Performed by: ANESTHESIOLOGY

## 2019-03-05 RX ORDER — ONDANSETRON 2 MG/ML
4 INJECTION INTRAMUSCULAR; INTRAVENOUS ONCE AS NEEDED
Status: DISCONTINUED | OUTPATIENT
Start: 2019-03-05 | End: 2019-03-05

## 2019-03-05 RX ORDER — SODIUM CHLORIDE, SODIUM LACTATE, POTASSIUM CHLORIDE, CALCIUM CHLORIDE 600; 310; 30; 20 MG/100ML; MG/100ML; MG/100ML; MG/100ML
100 INJECTION, SOLUTION INTRAVENOUS CONTINUOUS
Status: CANCELLED | OUTPATIENT
Start: 2019-03-05

## 2019-03-05 RX ORDER — METHYLPREDNISOLONE ACETATE 40 MG/ML
INJECTION, SUSPENSION INTRA-ARTICULAR; INTRALESIONAL; INTRAMUSCULAR; SOFT TISSUE AS NEEDED
Status: DISCONTINUED | OUTPATIENT
Start: 2019-03-05 | End: 2019-03-05

## 2019-03-05 RX ORDER — DIPHENHYDRAMINE HYDROCHLORIDE 50 MG/ML
50 INJECTION INTRAMUSCULAR; INTRAVENOUS ONCE AS NEEDED
Status: DISCONTINUED | OUTPATIENT
Start: 2019-03-05 | End: 2019-03-05

## 2019-03-05 RX ORDER — LIDOCAINE HYDROCHLORIDE 10 MG/ML
INJECTION, SOLUTION EPIDURAL; INFILTRATION; INTRACAUDAL; PERINEURAL AS NEEDED
Status: DISCONTINUED | OUTPATIENT
Start: 2019-03-05 | End: 2019-03-05

## 2019-03-05 RX ORDER — ONDANSETRON 2 MG/ML
4 INJECTION INTRAMUSCULAR; INTRAVENOUS ONCE AS NEEDED
Status: CANCELLED | OUTPATIENT
Start: 2019-03-05 | End: 2019-03-05

## 2019-03-05 NOTE — H&P
History & Physical Examination    Patient Name: Arden Farnsworth  MRN: [id number]  CSN: [id number]  YOB: 1960    Pre-Operative Diagnosis:  Osteoarthritis of left hip, unspecified osteoarthritis type [M16.12]    Present Illness: A 60year old [facility] 1/25/2017    • Shoulder pain, left 1/25/2013   • Shoulder pain, right 9/23/2012   • Smoking addiction 9/15/2013   • Solitary cyst of breast      Past Surgical History:   Procedure Laterality Date   • COLONOSCOPY  2011   • COLONOSCOPY  04/25 APRN

## 2019-03-05 NOTE — OPERATIVE REPORT
[facility]  Operative Report  3/5/2019     Mario Alberto Swain Patient Status:  Hospital Outpatient Surgery    11/3/1960 MRN [id number]   [facility] ENDOSCOPY Attending Solange Syed MD   [facility] Day # 0 PCP DO Lurdes Burnett basis.    Brittany Arzola MD

## 2019-03-11 ENCOUNTER — TELEPHONE (OUTPATIENT)
Dept: PAIN CLINIC | Facility: CLINIC | Age: 59
End: 2019-03-11

## 2019-03-11 NOTE — TELEPHONE ENCOUNTER
Spoke with patient regarding left hip joint injection  Patient states first two days after injection 0/10 pain third day reports 2/10 pain with hip slipping  Patient will follow up with PCP and will call office to schedule appointment

## 2019-03-28 ENCOUNTER — OFFICE VISIT (OUTPATIENT)
Dept: PAIN CLINIC | Facility: CLINIC | Age: 59
End: 2019-03-28
Payer: MEDICAID

## 2019-03-28 VITALS
SYSTOLIC BLOOD PRESSURE: 136 MMHG | DIASTOLIC BLOOD PRESSURE: 80 MMHG | HEIGHT: 62 IN | BODY MASS INDEX: 34.96 KG/M2 | WEIGHT: 190 LBS | RESPIRATION RATE: 16 BRPM | HEART RATE: 76 BPM | OXYGEN SATURATION: 97 %

## 2019-03-28 DIAGNOSIS — M16.12 OSTEOARTHRITIS OF LEFT HIP, UNSPECIFIED OSTEOARTHRITIS TYPE: Primary | ICD-10-CM

## 2019-03-28 PROCEDURE — 99212 OFFICE O/P EST SF 10 MIN: CPT | Performed by: NURSE PRACTITIONER

## 2019-03-28 NOTE — PROGRESS NOTES
Name: Radha Braga   : 11/3/1960   DOS: 3/28/2019     Pain Clinic Follow Up Visit:   Radha Braga is a 60year old female who presents for follow up visit post left hip joint injection (3/5/19).  Pt states she has excellent relief; about 90% im discuss and be evaluated. Discussed expectations post injection relief. Pt verbalized understanding. 2. Advised pt to continue to be physically active; endorse daily stretching, and modifications as needed; and awareness of body mechanics.    3. Encourage

## 2019-04-26 ENCOUNTER — OFFICE VISIT (OUTPATIENT)
Dept: FAMILY MEDICINE CLINIC | Facility: CLINIC | Age: 59
End: 2019-04-26
Payer: MEDICAID

## 2019-04-26 VITALS
HEART RATE: 72 BPM | HEIGHT: 62 IN | BODY MASS INDEX: 36.07 KG/M2 | WEIGHT: 196 LBS | SYSTOLIC BLOOD PRESSURE: 128 MMHG | OXYGEN SATURATION: 98 % | DIASTOLIC BLOOD PRESSURE: 78 MMHG

## 2019-04-26 DIAGNOSIS — R63.0 DECREASED APPETITE: ICD-10-CM

## 2019-04-26 DIAGNOSIS — K86.9 LESION OF PANCREAS: ICD-10-CM

## 2019-04-26 DIAGNOSIS — R19.8 POSITIVE MURPHY'S SIGN: ICD-10-CM

## 2019-04-26 DIAGNOSIS — I10 ESSENTIAL HYPERTENSION, BENIGN: ICD-10-CM

## 2019-04-26 DIAGNOSIS — R11.0 NAUSEA: Primary | ICD-10-CM

## 2019-04-26 PROCEDURE — 99214 OFFICE O/P EST MOD 30 MIN: CPT | Performed by: FAMILY MEDICINE

## 2019-04-26 RX ORDER — HYDROCHLOROTHIAZIDE 25 MG/1
25 TABLET ORAL EVERY MORNING
Qty: 90 TABLET | Refills: 3 | Status: SHIPPED | OUTPATIENT
Start: 2019-04-26 | End: 2019-10-28

## 2019-04-26 NOTE — PROGRESS NOTES
Hema Goddard is a 60year old female. HPI:     Roomer's notes read and reviewed with pt. HTN: Stable. Severity is mild, patient is on one agent to control her hypertension.   Pt has been taking medications as instructed, no medication side midpole 6.7cm on CT done at [facility] [address] 1/25/2017    • Shoulder pain, left 1/25/2013   • Shoulder pain, right 9/23/2012   • Smoking addiction 9/15/2013   • Solitary cyst of breast       Past Surgical History:   Procedure Laterality Date stool/bloating/constipation/diarrhea  : no urinary symptoms  NEURO: denies headaches/dizziness/fainting/weakness/change in vision  PSYCH: denies depression and anxiety        EXAM:   /78 (BP Location: Left arm, Patient Position: Sitting, Cuff Size: Granulocyte %      % 0.5 0.4   Glucose      70 - 99 mg/dL 84 95   Sodium      136 - 144 mmol/L 139 140   Potassium      3.6 - 5.1 mmol/L 3.6 3.8   Chloride      101 - 111 mmol/L 103 104   Carbon Dioxide, Total      22.0 - 32.0 mmol/L 28.0 28.0   ANION GAP Take 1 tablet (25 mg total) by mouth every morning. Dispense: 90 tablet; Refill: 3    5. Positive Guzmán's Sign  - US ABDOMEN COMPLETE (CPT=76700);  Future  - GASTRO - INTERNAL            Meds & Refills for this Visit:  Requested Prescriptions     Signed P

## 2019-05-06 ENCOUNTER — TELEPHONE (OUTPATIENT)
Dept: SURGERY | Facility: CLINIC | Age: 59
End: 2019-05-06

## 2019-05-06 NOTE — TELEPHONE ENCOUNTER
Pt calling back to schedule repeat of LT hip joint injection. Pt s/p LT hip JI 3/5/19. Pt states symptoms are \"just coming back. \"  Informed pt repeat injections are done typically 3 months from last injection. Pt states she is agreeable to proceed.   In

## 2019-05-07 ENCOUNTER — HOSPITAL ENCOUNTER (OUTPATIENT)
Dept: ULTRASOUND IMAGING | Age: 59
Discharge: HOME | End: 2019-05-07
Attending: FAMILY MEDICINE
Payer: MEDICAID

## 2019-05-07 DIAGNOSIS — K86.9 LESION OF PANCREAS: ICD-10-CM

## 2019-05-07 DIAGNOSIS — R11.0 NAUSEA: ICD-10-CM

## 2019-05-07 DIAGNOSIS — R63.0 DECREASED APPETITE: ICD-10-CM

## 2019-05-07 DIAGNOSIS — R19.8 POSITIVE MURPHY'S SIGN: ICD-10-CM

## 2019-05-07 PROCEDURE — 76700 US EXAM ABDOM COMPLETE: CPT | Performed by: FAMILY MEDICINE

## 2019-05-10 NOTE — TELEPHONE ENCOUNTER
Spoke to pt to inform her she will need an OV prior to scheduling a repeat left hip injection. Pt verbalized understanding. Offered to schedule an OV for later this month. Pt states she will call back to schedule.

## 2019-05-13 ENCOUNTER — TELEPHONE (OUTPATIENT)
Dept: FAMILY MEDICINE CLINIC | Facility: CLINIC | Age: 59
End: 2019-05-13

## 2019-05-13 DIAGNOSIS — N28.1 KIDNEY CYSTS: Primary | ICD-10-CM

## 2019-05-13 NOTE — TELEPHONE ENCOUNTER
Holland Suárez is calling to get a referral to Dr Larraine Brunner office, he is a Urologist and she is going to see him for her Kidney cyst she did supply a phone number for Dr Kelley Rae [phone] but no fax number, any questions or concerns please call Holland Suárez at [phone]

## 2019-05-13 NOTE — TELEPHONE ENCOUNTER
Nelosn Anglin is calling to get the results of her US of bladder and kidney's she had it done last week and she has not seen the results in My chart yet, please call Nelson Anglin at [phone]

## 2019-05-13 NOTE — TELEPHONE ENCOUNTER
Spoke to patient and she verbalized understanding. She states she already has a urologist in place.   She said she is going on 5/16 to see a GI Dr. Olivia Sexton  [phone] fax [phone] in [place] and she wants a copy of the U/S faxed to his office a

## 2019-05-30 ENCOUNTER — MED REC SCAN ONLY (OUTPATIENT)
Dept: FAMILY MEDICINE CLINIC | Facility: CLINIC | Age: 59
End: 2019-05-30

## 2019-06-28 DIAGNOSIS — R05.3 CHRONIC COUGH: ICD-10-CM

## 2019-06-28 DIAGNOSIS — F17.210 SMOKES CIGARETTES: ICD-10-CM

## 2019-06-28 RX ORDER — IPRATROPIUM/ALBUTEROL SULFATE 20-100 MCG
MIST INHALER (GRAM) INHALATION
Qty: 4 G | Refills: 0 | Status: SHIPPED | OUTPATIENT
Start: 2019-06-28 | End: 2019-07-31

## 2019-07-31 ENCOUNTER — OFFICE VISIT (OUTPATIENT)
Dept: FAMILY MEDICINE CLINIC | Facility: CLINIC | Age: 59
End: 2019-07-31
Payer: MEDICAID

## 2019-07-31 VITALS
HEIGHT: 62 IN | SYSTOLIC BLOOD PRESSURE: 126 MMHG | HEART RATE: 68 BPM | WEIGHT: 187 LBS | BODY MASS INDEX: 34.41 KG/M2 | DIASTOLIC BLOOD PRESSURE: 72 MMHG

## 2019-07-31 DIAGNOSIS — E66.09 CLASS 1 OBESITY DUE TO EXCESS CALORIES WITH SERIOUS COMORBIDITY AND BODY MASS INDEX (BMI) OF 34.0 TO 34.9 IN ADULT: ICD-10-CM

## 2019-07-31 DIAGNOSIS — R05.3 CHRONIC COUGH: Primary | ICD-10-CM

## 2019-07-31 DIAGNOSIS — F17.210 CIGARETTE SMOKER: ICD-10-CM

## 2019-07-31 DIAGNOSIS — M75.21 BICEPS TENDINITIS OF RIGHT UPPER EXTREMITY: ICD-10-CM

## 2019-07-31 DIAGNOSIS — R94.2 DIFFUSION CAPACITY OF LUNG (DL), DECREASED: ICD-10-CM

## 2019-07-31 DIAGNOSIS — M67.911 ROTATOR CUFF DISORDER, RIGHT: ICD-10-CM

## 2019-07-31 DIAGNOSIS — M25.511 ACUTE PAIN OF RIGHT SHOULDER: ICD-10-CM

## 2019-07-31 PROBLEM — E66.811 CLASS 1 OBESITY DUE TO EXCESS CALORIES WITH SERIOUS COMORBIDITY AND BODY MASS INDEX (BMI) OF 34.0 TO 34.9 IN ADULT: Status: ACTIVE | Noted: 2019-07-31

## 2019-07-31 PROCEDURE — 99214 OFFICE O/P EST MOD 30 MIN: CPT | Performed by: FAMILY MEDICINE

## 2019-07-31 NOTE — PROGRESS NOTES
Anila Agosto is a 60year old female. HPI:         Obesity:  Intentional 9# weight loss in the last 3 months. Patient states she has more closely been watching her diet. Right shoulder pain:  2 weeks. Worse with certain movements.   Worse with right 1/27/2017    Upper to midpole 6.7cm on CT done at [facility] [address] 1/25/2017    • Shoulder pain, left 1/25/2013   • Shoulder pain, right 9/23/2012   • Smoking addiction 9/15/2013   • Solitary cyst of breast       Past Surgical History: stool/bloating/constipation/diarrhea  : denies urinary symptoms  NEURO: denies headaches/dizziness/fainting/weakness/change in vision  PSYCH: denies depression and anxiety      EXAM:   /72 (BP Location: Left arm, Patient Position: Sitting, Cuff Siz want to quit smoking altogether for now. - COMPLETE PFT; Future  - CT CHEST (CPT=71250); Future    4. Acute pain of right shoulder  5. Biceps tendinitis of right upper extremity  6.  Rotator cuff disorder, right  Patient declines physical therapy for now

## 2019-08-02 DIAGNOSIS — I10 ESSENTIAL HYPERTENSION, BENIGN: ICD-10-CM

## 2019-08-02 RX ORDER — HYDROCHLOROTHIAZIDE 25 MG/1
TABLET ORAL
Qty: 30 TABLET | Refills: 1 | OUTPATIENT
Start: 2019-08-02

## 2019-08-06 ENCOUNTER — TELEPHONE (OUTPATIENT)
Dept: FAMILY MEDICINE CLINIC | Facility: CLINIC | Age: 59
End: 2019-08-06

## 2019-08-06 DIAGNOSIS — Z12.31 ENCOUNTER FOR SCREENING MAMMOGRAM FOR MALIGNANT NEOPLASM OF BREAST: Primary | ICD-10-CM

## 2019-08-06 NOTE — TELEPHONE ENCOUNTER
Last mammogram 9/5/2018. Routine screening follow-up recommended. LOV with Harriett Mackey 7/31/2019. Mammogram order pended and forwarded to provider.

## 2019-08-07 NOTE — TELEPHONE ENCOUNTER
Future date of 9/5/2019 applied to the mammogram order as patient is due on or shortly after 9/5/2019 for repeat screening mammogram.  Please inform patient.

## 2019-08-08 NOTE — TELEPHONE ENCOUNTER
Left message for patient advising she needs to schedule mammo after 9/5/2019  Central scheduling information left on VM

## 2019-08-15 ENCOUNTER — HOSPITAL ENCOUNTER (OUTPATIENT)
Dept: CT IMAGING | Facility: HOSPITAL | Age: 59
Discharge: HOME | End: 2019-08-15
Attending: FAMILY MEDICINE
Payer: MEDICAID

## 2019-08-15 ENCOUNTER — RT VISIT (OUTPATIENT)
Dept: RESPIRATORY THERAPY | Facility: HOSPITAL | Age: 59
End: 2019-08-15
Attending: FAMILY MEDICINE
Payer: MEDICAID

## 2019-08-15 DIAGNOSIS — R05.3 CHRONIC COUGH: ICD-10-CM

## 2019-08-15 DIAGNOSIS — R94.2 DIFFUSION CAPACITY OF LUNG (DL), DECREASED: ICD-10-CM

## 2019-08-15 DIAGNOSIS — F17.210 CIGARETTE SMOKER: ICD-10-CM

## 2019-08-15 PROCEDURE — 94010 BREATHING CAPACITY TEST: CPT

## 2019-08-15 PROCEDURE — 71250 CT THORAX DX C-: CPT | Performed by: FAMILY MEDICINE

## 2019-08-15 PROCEDURE — 94726 PLETHYSMOGRAPHY LUNG VOLUMES: CPT

## 2019-08-15 PROCEDURE — 94729 DIFFUSING CAPACITY: CPT

## 2019-08-16 NOTE — PROCEDURES
Findings:  FEV1 is 1.50L, 74% predicted. FVC is 1.94L, 77% predicted. FEV1/ FVC ratio is 0.77. The flow-volume loop demonstrates a mixed pattern of obstruction and restriction. The TLC is 4.81L, 105% predicted.   The residual volume 2.83L, 157% predicte

## 2019-09-16 ENCOUNTER — LABORATORY ENCOUNTER (OUTPATIENT)
Dept: LAB | Age: 59
End: 2019-09-16
Attending: FAMILY MEDICINE
Payer: MEDICAID

## 2019-09-16 ENCOUNTER — OFFICE VISIT (OUTPATIENT)
Dept: FAMILY MEDICINE CLINIC | Facility: CLINIC | Age: 59
End: 2019-09-16
Payer: MEDICAID

## 2019-09-16 VITALS
BODY MASS INDEX: 35.15 KG/M2 | WEIGHT: 191 LBS | HEIGHT: 62 IN | DIASTOLIC BLOOD PRESSURE: 68 MMHG | SYSTOLIC BLOOD PRESSURE: 108 MMHG | HEART RATE: 74 BPM

## 2019-09-16 DIAGNOSIS — Z28.21 IMMUNIZATION NOT CARRIED OUT BECAUSE OF PATIENT REFUSAL: ICD-10-CM

## 2019-09-16 DIAGNOSIS — E55.9 VITAMIN D DEFICIENCY: ICD-10-CM

## 2019-09-16 DIAGNOSIS — Z00.00 ROUTINE GENERAL MEDICAL EXAMINATION AT A HEALTH CARE FACILITY: Primary | ICD-10-CM

## 2019-09-16 DIAGNOSIS — Z00.00 ROUTINE GENERAL MEDICAL EXAMINATION AT A HEALTH CARE FACILITY: ICD-10-CM

## 2019-09-16 LAB
ALBUMIN SERPL-MCNC: 4.1 G/DL (ref 3.4–5)
ALBUMIN/GLOB SERPL: 1.1 {RATIO} (ref 1–2)
ALP LIVER SERPL-CCNC: 53 U/L (ref 46–118)
ALT SERPL-CCNC: 22 U/L (ref 13–56)
ANION GAP SERPL CALC-SCNC: 7 MMOL/L (ref 0–18)
AST SERPL-CCNC: 15 U/L (ref 15–37)
BASOPHILS # BLD AUTO: 0.06 X10(3) UL (ref 0–0.2)
BASOPHILS NFR BLD AUTO: 0.6 %
BILIRUB SERPL-MCNC: 0.5 MG/DL (ref 0.1–2)
BUN BLD-MCNC: 7 MG/DL (ref 7–18)
BUN/CREAT SERPL: 10.4 (ref 10–20)
CALCIUM BLD-MCNC: 8.7 MG/DL (ref 8.5–10.1)
CHLORIDE SERPL-SCNC: 102 MMOL/L (ref 98–112)
CHOLEST SMN-MCNC: 221 MG/DL (ref ?–200)
CO2 SERPL-SCNC: 31 MMOL/L (ref 21–32)
CREAT BLD-MCNC: 0.67 MG/DL (ref 0.55–1.02)
DEPRECATED RDW RBC AUTO: 56.6 FL (ref 35.1–46.3)
EOSINOPHIL # BLD AUTO: 0.09 X10(3) UL (ref 0–0.7)
EOSINOPHIL NFR BLD AUTO: 0.8 %
ERYTHROCYTE [DISTWIDTH] IN BLOOD BY AUTOMATED COUNT: 16.1 % (ref 11–15)
GLOBULIN PLAS-MCNC: 3.6 G/DL (ref 2.8–4.4)
GLUCOSE BLD-MCNC: 98 MG/DL (ref 70–99)
HCT VFR BLD AUTO: 44.1 % (ref 35–48)
HDLC SERPL-MCNC: 83 MG/DL (ref 40–59)
HGB BLD-MCNC: 14 G/DL (ref 12–16)
IMM GRANULOCYTES # BLD AUTO: 0.03 X10(3) UL (ref 0–1)
IMM GRANULOCYTES NFR BLD: 0.3 %
LDLC SERPL CALC-MCNC: 121 MG/DL (ref ?–100)
LYMPHOCYTES # BLD AUTO: 4.17 X10(3) UL (ref 1–4)
LYMPHOCYTES NFR BLD AUTO: 39.3 %
M PROTEIN MFR SERPL ELPH: 7.7 G/DL (ref 6.4–8.2)
MCH RBC QN AUTO: 30.2 PG (ref 26–34)
MCHC RBC AUTO-ENTMCNC: 31.7 G/DL (ref 31–37)
MCV RBC AUTO: 95 FL (ref 80–100)
MONOCYTES # BLD AUTO: 0.63 X10(3) UL (ref 0.1–1)
MONOCYTES NFR BLD AUTO: 5.9 %
NEUTROPHILS # BLD AUTO: 5.63 X10 (3) UL (ref 1.5–7.7)
NEUTROPHILS # BLD AUTO: 5.63 X10(3) UL (ref 1.5–7.7)
NEUTROPHILS NFR BLD AUTO: 53.1 %
NONHDLC SERPL-MCNC: 138 MG/DL (ref ?–130)
OSMOLALITY SERPL CALC.SUM OF ELEC: 288 MOSM/KG (ref 275–295)
PLATELET # BLD AUTO: 393 10(3)UL (ref 150–450)
POTASSIUM SERPL-SCNC: 3.7 MMOL/L (ref 3.5–5.1)
RBC # BLD AUTO: 4.64 X10(6)UL (ref 3.8–5.3)
SODIUM SERPL-SCNC: 140 MMOL/L (ref 136–145)
T4 FREE SERPL-MCNC: 1.1 NG/DL (ref 0.8–1.7)
TRIGL SERPL-MCNC: 87 MG/DL (ref 30–149)
TSI SER-ACNC: 0.73 MIU/ML (ref 0.36–3.74)
VIT D+METAB SERPL-MCNC: 15.6 NG/ML (ref 30–100)
VLDLC SERPL CALC-MCNC: 17 MG/DL (ref 0–30)
WBC # BLD AUTO: 10.6 X10(3) UL (ref 4–11)

## 2019-09-16 PROCEDURE — 99396 PREV VISIT EST AGE 40-64: CPT | Performed by: FAMILY MEDICINE

## 2019-09-16 PROCEDURE — 80061 LIPID PANEL: CPT

## 2019-09-16 PROCEDURE — 85025 COMPLETE CBC W/AUTO DIFF WBC: CPT

## 2019-09-16 PROCEDURE — 36415 COLL VENOUS BLD VENIPUNCTURE: CPT

## 2019-09-16 PROCEDURE — 82306 VITAMIN D 25 HYDROXY: CPT

## 2019-09-16 PROCEDURE — 84443 ASSAY THYROID STIM HORMONE: CPT

## 2019-09-16 PROCEDURE — 80053 COMPREHEN METABOLIC PANEL: CPT

## 2019-09-16 PROCEDURE — 84439 ASSAY OF FREE THYROXINE: CPT

## 2019-09-16 NOTE — PROGRESS NOTES
HPI:   Adi Lunsford is a 60year old female   Symptoms: denies discharge, itching, burning or dysuria. TAHBSO age 28 yo.   Sexually active: no   Last colonoscopy: Due April 2027  Last mammogram: due this month          Wt Readings from Last 6 Encount the lungs every 4 to 6 hours as needed. Disp: 4 g Rfl: 2   hydrochlorothiazide 25 MG Oral Tab Take 1 tablet (25 mg total) by mouth every morning.  Disp: 90 tablet Rfl: 3      Past Medical History:   Diagnosis Date   • Cervicalgia 3/27/2014   • Chronic cough • TOTAL ABDOM HYSTERECTOMY  1985   • TOTAL HIP REPLACEMENT Left 2016    Dr. Bronson Mejia at [address] [place]  04/25/2017   • UPPER GI ENDOSCOPY,EXAM  05/21/2019    Dr. Sunil Turcios at [address] GENERAL: NAD, Pleasant AA female  SKIN: no rashes,no suspicious lesions  HEENT: atraumatic, normocephalic,ears, nose and throat are normal, mmm  EYES: PERRLA, EOMI, sclera, conjunctiva are clear  NECK: supple, no adenopathy/thyromegaly/masses  CHEST: no declined by patient.         Orders Placed This Encounter      Vitamin D, 25-Hydroxy [E]      TSH and Free T4 [E]      Lipid Panel [E]      Comp Metabolic Panel (14) [E]      CBC W Differential W Platelet [E]      Influenza Vaccine Refused (Order that docum

## 2019-10-12 DIAGNOSIS — R05.3 CHRONIC COUGH: ICD-10-CM

## 2019-10-14 RX ORDER — IPRATROPIUM/ALBUTEROL SULFATE 20-100 MCG
MIST INHALER (GRAM) INHALATION
Qty: 4 G | Refills: 2 | Status: SHIPPED | OUTPATIENT
Start: 2019-10-14 | End: 2020-03-23

## 2019-10-28 ENCOUNTER — TELEPHONE (OUTPATIENT)
Dept: FAMILY MEDICINE CLINIC | Facility: CLINIC | Age: 59
End: 2019-10-28

## 2019-10-28 DIAGNOSIS — I10 ESSENTIAL HYPERTENSION, BENIGN: ICD-10-CM

## 2019-10-28 RX ORDER — HYDROCHLOROTHIAZIDE 25 MG/1
25 TABLET ORAL EVERY MORNING
Qty: 90 TABLET | Refills: 1 | Status: SHIPPED | OUTPATIENT
Start: 2019-10-28 | End: 2020-03-23

## 2019-10-28 NOTE — TELEPHONE ENCOUNTER
Adri Kam is calling to get a refill on her hydrochlorothiazide 25 MG Oral Tab sent to her [address]  Questions or concerns please call Adri Kam at [phone]

## 2019-10-28 NOTE — TELEPHONE ENCOUNTER
Patient phoned back and stated pharmacy states they have no refills. I phoned pharmacy and they stated they had no refills for April. Sent 90 day fill with 1 refill.

## 2019-10-29 ENCOUNTER — HOSPITAL ENCOUNTER (OUTPATIENT)
Dept: MAMMOGRAPHY | Age: 59
Discharge: HOME | End: 2019-10-29
Attending: FAMILY MEDICINE
Payer: MEDICAID

## 2019-10-29 DIAGNOSIS — Z12.31 ENCOUNTER FOR SCREENING MAMMOGRAM FOR MALIGNANT NEOPLASM OF BREAST: ICD-10-CM

## 2019-10-29 PROCEDURE — 77067 SCR MAMMO BI INCL CAD: CPT | Performed by: FAMILY MEDICINE

## 2019-10-29 PROCEDURE — 77063 BREAST TOMOSYNTHESIS BI: CPT | Performed by: FAMILY MEDICINE

## 2019-11-11 ENCOUNTER — TELEPHONE (OUTPATIENT)
Dept: FAMILY MEDICINE CLINIC | Facility: CLINIC | Age: 59
End: 2019-11-11

## 2019-11-11 NOTE — TELEPHONE ENCOUNTER
Prescription/Certificate of Medical Necessity form signed and faxed back   ITEMS: Pads/liners, Gloves, Underpads    Copy sent to scanning

## 2019-12-16 RX ORDER — ERGOCALCIFEROL 1.25 MG/1
CAPSULE ORAL
Qty: 12 CAPSULE | Refills: 0 | OUTPATIENT
Start: 2019-12-16

## 2020-03-23 DIAGNOSIS — I10 ESSENTIAL HYPERTENSION, BENIGN: ICD-10-CM

## 2020-03-23 DIAGNOSIS — R05.3 CHRONIC COUGH: ICD-10-CM

## 2020-03-23 RX ORDER — HYDROCHLOROTHIAZIDE 25 MG/1
TABLET ORAL
Qty: 30 TABLET | Refills: 0 | Status: SHIPPED | OUTPATIENT
Start: 2020-03-23 | End: 2020-04-20

## 2020-03-23 NOTE — TELEPHONE ENCOUNTER
Pt requesting refill of HYDROCHLOROTHIAZIDE 25 MG Oral Tab, refill approved, sent to pharmacy x30 day supply    Last Time Medication was Filled: 10/28/19 qty#90+1 refill     Last Office Visit with Provider: 9/16/19  No future appointments.

## 2020-03-23 NOTE — TELEPHONE ENCOUNTER
Pt needs a refill on the following:    HYDROCHLOROTHIAZIDE 25 MG Oral Tab      COMBIVENT RESPIMAT  MCG/ACT Inhalation Aero Soln   she would like it sent to [facility].

## 2020-04-19 DIAGNOSIS — I10 ESSENTIAL HYPERTENSION, BENIGN: ICD-10-CM

## 2020-04-20 RX ORDER — HYDROCHLOROTHIAZIDE 25 MG/1
TABLET ORAL
Qty: 30 TABLET | Refills: 0 | Status: SHIPPED | OUTPATIENT
Start: 2020-04-20 | End: 2020-04-28

## 2020-04-20 NOTE — TELEPHONE ENCOUNTER
Pt requesting refill of HYDROCHLOROTHIAZIDE 25 MG Oral Tab  Passed protocol, refill approved, sent to pharmacy

## 2020-04-28 ENCOUNTER — VIRTUAL PHONE E/M (OUTPATIENT)
Dept: FAMILY MEDICINE CLINIC | Facility: CLINIC | Age: 60
End: 2020-04-28
Payer: MEDICAID

## 2020-04-28 DIAGNOSIS — E55.9 VITAMIN D DEFICIENCY: ICD-10-CM

## 2020-04-28 DIAGNOSIS — I10 ESSENTIAL HYPERTENSION, BENIGN: ICD-10-CM

## 2020-04-28 DIAGNOSIS — R07.89 RIGHT-SIDED CHEST WALL PAIN: Primary | ICD-10-CM

## 2020-04-28 PROCEDURE — 99214 OFFICE O/P EST MOD 30 MIN: CPT | Performed by: FAMILY MEDICINE

## 2020-04-28 RX ORDER — HYDROCHLOROTHIAZIDE 25 MG/1
25 TABLET ORAL EVERY MORNING
Qty: 30 TABLET | Refills: 11 | Status: SHIPPED | OUTPATIENT
Start: 2020-04-28 | End: 2021-03-18

## 2020-04-28 RX ORDER — CHLORAL HYDRATE 500 MG
1 CAPSULE ORAL
COMMUNITY
End: 2021-07-28

## 2020-04-28 RX ORDER — ACETAMINOPHEN 500 MG
1000 TABLET ORAL DAILY PRN
COMMUNITY

## 2020-04-28 NOTE — PROGRESS NOTES
Virtual/Telephone Check-In    Anamaria Mondragon verbally consents to a Virtual/Telephone Check-In service on 04/28/20. Patient understands and accepts financial responsibility for any deductible, co-insurance and/or co-pays associated with this service. vitamin D level yet. Current Outpatient Medications   Medication Sig Dispense Refill   • cholecalciferol (VITAMIN D3) 125 MCG (5000 UT) Oral Cap Take 5,000 Units by mouth daily with dinner.      • Omega-3 Fatty Acids (OMEGA-3 FISH OIL) 1000 MG Oral C Class 1 obesity due to excess calories with serious comorbidity and body mass index (BMI) of 34.0 to 34.9 in adult     Right-sided chest wall pain     Past Medical History:   Diagnosis Date   • Cervicalgia 3/27/2014   • Chronic cough 11/14/2012   • Current palpitations. GI: Denies nausea, vomiting, or diarrhea. MUSCULOSKELETAL: As in HPI. NEURO: Denies headaches. PSYCH: Mood is improving. EXAM:   There were no vitals taken for this visit. GENERAL: Pleasant. Speaking in full sentences.   LUNGS: No c

## 2020-04-28 NOTE — PATIENT INSTRUCTIONS
Salonpas lidocaine patch:                              Shingles (Herpes Zoster)     Talk to your healthcare provider about the shingles vaccine. Shingles is also called herpes zoster. It is a painful skin rash caused by the herpes zoster virus.  [employer] For most people, shingles heals on its own in a few weeks.  But treatment is recommended to help relieve pain, speed healing, and reduce the risk of complications. Antiviral medicines are prescribed within the first 72 hours of the appearance of the rash. T You can only get shingles if you have had chickenpox in the past. Those who have never had chickenpox can get the virus from you. Although instead of developing shingles, the person may get chickenpox.  Until your blisters form scabs, avoid contact with oth

## 2020-05-16 DIAGNOSIS — R05.3 CHRONIC COUGH: ICD-10-CM

## 2020-05-16 DIAGNOSIS — I10 ESSENTIAL HYPERTENSION, BENIGN: ICD-10-CM

## 2020-05-18 ENCOUNTER — TELEPHONE (OUTPATIENT)
Dept: FAMILY MEDICINE CLINIC | Facility: CLINIC | Age: 60
End: 2020-05-18

## 2020-05-18 RX ORDER — HYDROCHLOROTHIAZIDE 25 MG/1
TABLET ORAL
Qty: 30 TABLET | Refills: 0 | OUTPATIENT
Start: 2020-05-18

## 2020-05-18 RX ORDER — IPRATROPIUM/ALBUTEROL SULFATE 20-100 MCG
MIST INHALER (GRAM) INHALATION
Qty: 4 G | Refills: 2 | Status: SHIPPED | OUTPATIENT
Start: 2020-05-18 | End: 2020-08-12

## 2020-05-18 NOTE — TELEPHONE ENCOUNTER
Medications did not get denied. Refill sent for combivent today. Hydrochlorothiazide was refilled 4/28/20 for qty#30 with 11 refills (enough for one year). Left VM for patient stating above and also sent my chart message.

## 2020-05-18 NOTE — TELEPHONE ENCOUNTER
Pt had a visit with Dr. Katharina Hernandez on 4/28/20 and she said they spoke about her medication refills and Dr. Katharina Hernandez told her to call the office when she is low and she will reorder them. She does not understand why they got denied.

## 2020-05-18 NOTE — TELEPHONE ENCOUNTER
Pt requesting refill of HYDROCHLOROTHIAZIDE 25 MG Oral Tab   Rx denied, refill sent 4/28/20 for qty#30 with 11 refills     COMBIVENT RESPIMAT  MCG/ACT Inhalation Aero Soln,  refill approved, sent to pharmacy:

## 2020-08-12 DIAGNOSIS — R05.3 CHRONIC COUGH: ICD-10-CM

## 2020-08-12 RX ORDER — IPRATROPIUM/ALBUTEROL SULFATE 20-100 MCG
MIST INHALER (GRAM) INHALATION
Qty: 4 G | Refills: 2 | Status: SHIPPED | OUTPATIENT
Start: 2020-08-12 | End: 2020-11-16

## 2020-08-12 NOTE — TELEPHONE ENCOUNTER
Pt requesting refill of   COMBIVENT RESPIMAT  MCG/ACT Inhalation Aero Soln          refill approved, sent to pharmacy:     Last Time Medication was Filled:  4/30/20    Last Office Visit with Provider: 4/28/2020 2:00 PM   Virtual Phone       Appt sc

## 2020-08-19 ENCOUNTER — OFFICE VISIT (OUTPATIENT)
Dept: FAMILY MEDICINE CLINIC | Facility: CLINIC | Age: 60
End: 2020-08-19
Payer: MEDICAID

## 2020-08-19 VITALS
OXYGEN SATURATION: 98 % | BODY MASS INDEX: 35.51 KG/M2 | SYSTOLIC BLOOD PRESSURE: 128 MMHG | HEART RATE: 85 BPM | WEIGHT: 193 LBS | HEIGHT: 62 IN | TEMPERATURE: 98 F | DIASTOLIC BLOOD PRESSURE: 74 MMHG

## 2020-08-19 DIAGNOSIS — R06.00 DYSPNEA ON EXERTION: ICD-10-CM

## 2020-08-19 DIAGNOSIS — F17.210 CIGARETTE SMOKER: ICD-10-CM

## 2020-08-19 DIAGNOSIS — Z82.49 FAMILY HISTORY OF THORACIC AORTIC ANEURYSM: ICD-10-CM

## 2020-08-19 DIAGNOSIS — R07.9 EXERTIONAL CHEST PAIN: ICD-10-CM

## 2020-08-19 DIAGNOSIS — I77.810 THORACIC AORTIC ECTASIA (HCC): Primary | ICD-10-CM

## 2020-08-19 PROBLEM — R06.09 DYSPNEA ON EXERTION: Status: ACTIVE | Noted: 2020-08-19

## 2020-08-19 PROCEDURE — 3008F BODY MASS INDEX DOCD: CPT | Performed by: FAMILY MEDICINE

## 2020-08-19 PROCEDURE — 3074F SYST BP LT 130 MM HG: CPT | Performed by: FAMILY MEDICINE

## 2020-08-19 PROCEDURE — 3078F DIAST BP <80 MM HG: CPT | Performed by: FAMILY MEDICINE

## 2020-08-19 PROCEDURE — 99214 OFFICE O/P EST MOD 30 MIN: CPT | Performed by: FAMILY MEDICINE

## 2020-08-19 RX ORDER — MULTIVIT WITH MINERALS/LUTEIN
1000 TABLET ORAL DAILY
COMMUNITY

## 2020-08-19 NOTE — PROGRESS NOTES
Kathrin Warren is a 60year old female. HPI:       Medical assistant's/Nurse's notes read, reviewed, and confirmed with patient/patient's caregiver. Chest pain:  Right. Patient states the pain is not in the breast.  Started November 2019.   Bet • Hip pain, right 9/23/2012   • History of fusion of cervical spine 3/8/2014   • History of hernia repair 4/29/2013   • History of umbilical hernia repair 9/83/6383   • Hypertension goal BP (blood pressure) < 130/80 11/14/2012   • Lung nodule 5/20/2014 Cigarettes      Smokeless tobacco: Never Used      Tobacco comment: 4-5 cigarettes per day    Alcohol use: No      Alcohol/week: 0.0 standard drinks      Comment: occasionally 1, 2/ year    Drug use: Yes      Types: Cannabis      Comment: For pain        F ([facility]) NRDR ([address]) which includes the Dose   Index Registry. PATIENT STATED HISTORY: (As transcribed by Technologist)  Patient has a chronic cough, and right sided chest pain.          FINDINGS:    CAITLIN assistance. Patient currently not interested in smoking cessation. Imaging & Consults:  CT ANGIOGRAPHY, CHEST (CPT=71275)    Return Pending results of testing. Clara Sheldon

## 2020-08-22 ENCOUNTER — HOSPITAL ENCOUNTER (OUTPATIENT)
Dept: CT IMAGING | Age: 60
Discharge: HOME | End: 2020-08-22
Attending: FAMILY MEDICINE
Payer: MEDICAID

## 2020-08-22 DIAGNOSIS — F17.210 CIGARETTE SMOKER: ICD-10-CM

## 2020-08-22 DIAGNOSIS — R07.9 EXERTIONAL CHEST PAIN: ICD-10-CM

## 2020-08-22 DIAGNOSIS — I77.810 THORACIC AORTIC ECTASIA (HCC): ICD-10-CM

## 2020-08-22 DIAGNOSIS — Z82.49 FAMILY HISTORY OF THORACIC AORTIC ANEURYSM: ICD-10-CM

## 2020-08-22 DIAGNOSIS — R06.00 DYSPNEA ON EXERTION: ICD-10-CM

## 2020-08-22 LAB — CREAT BLD-MCNC: 0.6 MG/DL (ref 0.55–1.02)

## 2020-08-22 PROCEDURE — 71275 CT ANGIOGRAPHY CHEST: CPT | Performed by: FAMILY MEDICINE

## 2020-08-22 PROCEDURE — 82565 ASSAY OF CREATININE: CPT

## 2020-08-27 ENCOUNTER — TELEPHONE (OUTPATIENT)
Dept: FAMILY MEDICINE CLINIC | Facility: CLINIC | Age: 60
End: 2020-08-27

## 2020-08-27 DIAGNOSIS — Z12.31 ENCOUNTER FOR SCREENING MAMMOGRAM FOR MALIGNANT NEOPLASM OF BREAST: Primary | ICD-10-CM

## 2020-08-27 NOTE — TELEPHONE ENCOUNTER
Sundeep Pradhan is calling to see if Dr Agnieszka Jacobson can put in a mammogram order for her, she made a appt for her stress test, she wants to make her mammogram order for the same day, but she does not want to be running back and forth to the hospital, because she has to s

## 2020-09-14 ENCOUNTER — HOSPITAL ENCOUNTER (OUTPATIENT)
Dept: CV DIAGNOSTICS | Age: 60
Discharge: HOME | End: 2020-09-14
Attending: FAMILY MEDICINE
Payer: MEDICAID

## 2020-09-14 DIAGNOSIS — R06.00 DYSPNEA ON EXERTION: ICD-10-CM

## 2020-09-14 DIAGNOSIS — R07.9 EXERTIONAL CHEST PAIN: ICD-10-CM

## 2020-09-14 PROCEDURE — 93017 CV STRESS TEST TRACING ONLY: CPT | Performed by: FAMILY MEDICINE

## 2020-09-14 PROCEDURE — 93018 CV STRESS TEST I&R ONLY: CPT | Performed by: FAMILY MEDICINE

## 2020-09-14 PROCEDURE — 78452 HT MUSCLE IMAGE SPECT MULT: CPT | Performed by: FAMILY MEDICINE

## 2020-11-16 DIAGNOSIS — R05.3 CHRONIC COUGH: ICD-10-CM

## 2020-11-16 RX ORDER — IPRATROPIUM/ALBUTEROL SULFATE 20-100 MCG
MIST INHALER (GRAM) INHALATION
Qty: 3 INHALER | Refills: 1 | Status: SHIPPED | OUTPATIENT
Start: 2020-11-16 | End: 2021-03-18

## 2020-11-16 NOTE — TELEPHONE ENCOUNTER
Pt requesting refill of COMBIVENT RESPIMAT  MCG/ACT Inhalation Aero Soln     Last Time Medication was Filled:  8/12/20 +2 refills     Last Office Visit with Provider: 8/19/20  No future appointments.

## 2021-01-14 ENCOUNTER — MED REC SCAN ONLY (OUTPATIENT)
Dept: FAMILY MEDICINE CLINIC | Facility: CLINIC | Age: 61
End: 2021-01-14

## 2021-03-18 ENCOUNTER — OFFICE VISIT (OUTPATIENT)
Dept: FAMILY MEDICINE CLINIC | Facility: CLINIC | Age: 61
End: 2021-03-18
Payer: MEDICAID

## 2021-03-18 VITALS
TEMPERATURE: 97 F | OXYGEN SATURATION: 98 % | SYSTOLIC BLOOD PRESSURE: 130 MMHG | BODY MASS INDEX: 33.68 KG/M2 | HEIGHT: 62 IN | WEIGHT: 183 LBS | DIASTOLIC BLOOD PRESSURE: 70 MMHG | HEART RATE: 78 BPM

## 2021-03-18 DIAGNOSIS — Z78.0 POST-MENOPAUSAL: ICD-10-CM

## 2021-03-18 DIAGNOSIS — F17.210 CIGARETTE SMOKER: ICD-10-CM

## 2021-03-18 DIAGNOSIS — Z71.6 ENCOUNTER FOR SMOKING CESSATION COUNSELING: ICD-10-CM

## 2021-03-18 DIAGNOSIS — M79.89 MASS OF SOFT TISSUE OF SHOULDER: ICD-10-CM

## 2021-03-18 DIAGNOSIS — R05.3 CHRONIC COUGH: ICD-10-CM

## 2021-03-18 DIAGNOSIS — I10 ESSENTIAL HYPERTENSION, BENIGN: ICD-10-CM

## 2021-03-18 DIAGNOSIS — Z00.00 ROUTINE GENERAL MEDICAL EXAMINATION AT A HEALTH CARE FACILITY: Primary | ICD-10-CM

## 2021-03-18 DIAGNOSIS — E55.9 VITAMIN D DEFICIENCY: ICD-10-CM

## 2021-03-18 DIAGNOSIS — E66.09 CLASS 1 OBESITY DUE TO EXCESS CALORIES WITH SERIOUS COMORBIDITY AND BODY MASS INDEX (BMI) OF 33.0 TO 33.9 IN ADULT: ICD-10-CM

## 2021-03-18 PROCEDURE — 3008F BODY MASS INDEX DOCD: CPT | Performed by: FAMILY MEDICINE

## 2021-03-18 PROCEDURE — 99396 PREV VISIT EST AGE 40-64: CPT | Performed by: FAMILY MEDICINE

## 2021-03-18 PROCEDURE — 3075F SYST BP GE 130 - 139MM HG: CPT | Performed by: FAMILY MEDICINE

## 2021-03-18 PROCEDURE — 99214 OFFICE O/P EST MOD 30 MIN: CPT | Performed by: FAMILY MEDICINE

## 2021-03-18 PROCEDURE — 3078F DIAST BP <80 MM HG: CPT | Performed by: FAMILY MEDICINE

## 2021-03-18 RX ORDER — HYDROCHLOROTHIAZIDE 25 MG/1
25 TABLET ORAL EVERY MORNING
Qty: 90 TABLET | Refills: 3 | Status: SHIPPED | OUTPATIENT
Start: 2021-03-18 | End: 2022-01-28

## 2021-03-18 RX ORDER — IPRATROPIUM/ALBUTEROL SULFATE 20-100 MCG
1 MIST INHALER (GRAM) INHALATION EVERY 4 HOURS PRN
Qty: 3 INHALER | Refills: 3 | Status: SHIPPED | OUTPATIENT
Start: 2021-03-18

## 2021-03-18 NOTE — PROGRESS NOTES
HPI:   Toshia Cruz is a 63year old female   Symptoms: denies discharge, itching, burning or dysuria. Hysterectomy and b/l oophorectomy age 31, heavy periods, but the surgeon retired under possibly suspicious circumstances.    Last colonoscopy: UTD hydrochlorothiazide 25 MG Oral Tab Take 1 tablet (25 mg total) by mouth every morning. 90 tablet 3   • Ascorbic Acid (VITAMIN C) 1000 MG Oral Tab Take 1,000 mg by mouth daily.      • cholecalciferol (VITAMIN D3) 125 MCG (5000 UT) Oral Cap Take 5,000 Units b Víctor Yusuf [facility]   • ESOPHAGOGASTRODUODENOSCOPY (EGD) N/A 9/30/2014    Performed by Rocky Valente MD at [facility]   • HERNIA SURGERY  June 2013   • HIP JOINT INJECTION Left 3/5/2019    Performed by Jarrell Dumont MD at [facility] discharge or discomfort  MUSCULOSKELETAL: No new joint pain or stiffness  NEURO: denies headaches, tingling or dizziness  PSYCH: denies depression or anxiety  HEMATOLOGIC: denies bleeding abnormalities  ENDOCRINE: denies temperature intolerance, polyuria, uL 5.63 6.81 (H)   Neutrophils Absolute      1.50 - 7.70 x10(3) uL 5.63 6.81 (H)   Lymphocytes Absolute      1.00 - 4.00 x10(3) uL 4.17 (H) 4.61 (H)   Monocytes Absolute      0.10 - 1.00 x10(3) uL 0.63 0.77   Eosinophils Absolute      0.00 - 0.70 x10(3) uL Routine general medical examination at a health care facility  (primary encounter diagnosis)  Post-menopausal  Essential hypertension, benign  Cigarette smoker  Encounter for smoking cessation counseling  Chronic cough  Mass of soft tissue of shoulde Inhaler; Refill: 3    7. Mass of soft tissue of shoulder  Lipoma versus other. Ultrasound ordered and pending to evaluate further.  - US SHOULDER RIGHT LIMITED (YHQ=61494); Future    8.  Class 1 obesity due to excess calories with serious comorbidity and b

## 2021-03-23 PROBLEM — M25.511 ACUTE PAIN OF RIGHT SHOULDER: Status: RESOLVED | Noted: 2019-07-31 | Resolved: 2021-03-23

## 2021-03-23 PROBLEM — N61.0 ACUTE MASTITIS OF LEFT BREAST: Status: RESOLVED | Noted: 2017-06-07 | Resolved: 2021-03-23

## 2021-03-23 PROBLEM — R10.32 ABDOMINAL PAIN, LEFT LOWER QUADRANT: Status: RESOLVED | Noted: 2017-05-15 | Resolved: 2021-03-23

## 2021-03-23 PROBLEM — E66.811 CLASS 1 OBESITY DUE TO EXCESS CALORIES WITH SERIOUS COMORBIDITY AND BODY MASS INDEX (BMI) OF 34.0 TO 34.9 IN ADULT: Status: RESOLVED | Noted: 2019-07-31 | Resolved: 2021-03-23

## 2021-03-23 PROBLEM — E66.09 CLASS 1 OBESITY DUE TO EXCESS CALORIES WITH SERIOUS COMORBIDITY AND BODY MASS INDEX (BMI) OF 33.0 TO 33.9 IN ADULT: Status: ACTIVE | Noted: 2021-03-23

## 2021-03-23 PROBLEM — R10.31 RIGHT LOWER QUADRANT ABDOMINAL PAIN: Status: RESOLVED | Noted: 2017-01-24 | Resolved: 2021-03-23

## 2021-03-23 PROBLEM — M25.562 ACUTE PAIN OF LEFT KNEE: Status: RESOLVED | Noted: 2018-03-16 | Resolved: 2021-03-23

## 2021-03-23 PROBLEM — R11.0 NAUSEA: Status: RESOLVED | Noted: 2019-04-26 | Resolved: 2021-03-23

## 2021-03-23 PROBLEM — M54.50 ACUTE RIGHT-SIDED LOW BACK PAIN WITHOUT SCIATICA: Status: RESOLVED | Noted: 2017-11-10 | Resolved: 2021-03-23

## 2021-03-23 PROBLEM — E66.09 CLASS 1 OBESITY DUE TO EXCESS CALORIES WITH SERIOUS COMORBIDITY AND BODY MASS INDEX (BMI) OF 34.0 TO 34.9 IN ADULT: Status: RESOLVED | Noted: 2019-07-31 | Resolved: 2021-03-23

## 2021-03-23 PROBLEM — R10.12 LEFT UPPER QUADRANT PAIN: Status: RESOLVED | Noted: 2017-05-15 | Resolved: 2021-03-23

## 2021-03-23 PROBLEM — R07.89 RIGHT-SIDED CHEST WALL PAIN: Status: RESOLVED | Noted: 2020-04-28 | Resolved: 2021-03-23

## 2021-03-23 PROBLEM — R19.8 POSITIVE MURPHY'S SIGN: Status: RESOLVED | Noted: 2019-04-26 | Resolved: 2021-03-23

## 2021-03-23 PROBLEM — E66.811 CLASS 1 OBESITY DUE TO EXCESS CALORIES WITH SERIOUS COMORBIDITY AND BODY MASS INDEX (BMI) OF 33.0 TO 33.9 IN ADULT: Status: ACTIVE | Noted: 2021-03-23

## 2021-03-23 PROBLEM — M79.89 MASS OF SOFT TISSUE OF SHOULDER: Status: ACTIVE | Noted: 2021-03-23

## 2021-03-23 PROBLEM — R63.0 DECREASED APPETITE: Status: RESOLVED | Noted: 2019-04-26 | Resolved: 2021-03-23

## 2021-03-23 PROBLEM — M25.552 ACUTE HIP PAIN, LEFT: Status: RESOLVED | Noted: 2018-03-16 | Resolved: 2021-03-23

## 2021-03-23 PROBLEM — M25.532 ACUTE PAIN OF LEFT WRIST: Status: RESOLVED | Noted: 2017-09-08 | Resolved: 2021-03-23

## 2021-03-23 PROBLEM — Z00.00 ROUTINE GENERAL MEDICAL EXAMINATION AT A HEALTH CARE FACILITY: Status: RESOLVED | Noted: 2018-08-24 | Resolved: 2021-03-23
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
## 2021-04-16 ENCOUNTER — TELEPHONE (OUTPATIENT)
Dept: FAMILY MEDICINE CLINIC | Facility: CLINIC | Age: 61
End: 2021-04-16

## 2021-04-16 NOTE — TELEPHONE ENCOUNTER
US has been authorized. VM left on confidential VM of Rich informing him. Advised to call the office if further questions.

## 2021-04-16 NOTE — TELEPHONE ENCOUNTER
Shawn with [facility] called states pt has an US of the shoulder scheduled for 4//20 but there is no authorization in the computer and requires one due to the insurance the pt has. Agus Correa can be reached at [phone] with any questions.

## 2021-05-10 ENCOUNTER — TELEPHONE (OUTPATIENT)
Dept: FAMILY MEDICINE CLINIC | Facility: CLINIC | Age: 61
End: 2021-05-10

## 2021-05-10 DIAGNOSIS — R05.3 CHRONIC COUGH: ICD-10-CM

## 2021-05-10 DIAGNOSIS — I10 ESSENTIAL HYPERTENSION, BENIGN: ICD-10-CM

## 2021-05-10 DIAGNOSIS — F17.210 CIGARETTE SMOKER: ICD-10-CM

## 2021-05-10 RX ORDER — IPRATROPIUM/ALBUTEROL SULFATE 20-100 MCG
MIST INHALER (GRAM) INHALATION
Refills: 1 | OUTPATIENT
Start: 2021-05-10

## 2021-05-10 RX ORDER — HYDROCHLOROTHIAZIDE 25 MG/1
TABLET ORAL
Refills: 11 | OUTPATIENT
Start: 2021-05-10

## 2021-05-10 NOTE — TELEPHONE ENCOUNTER
Nixon Berg is calling because she wanted the office to know that last month she requested a refill on her hydrochlorothiazide 25 MG Oral Tab, early because she was going out of town and she would have been out, she would like her refill sent to her P[facility]

## 2021-05-10 NOTE — TELEPHONE ENCOUNTER
hydrochlorothiazide 25 MG Oral Tab 90 tablet 3 3/18/2021      Ipratropium-Albuterol (COMBIVENT RESPIMAT)  MCG/ACT Inhalation Aero Soln 3 Inhaler 3 3/18/2021     Rx denied.  Request too soon

## 2021-05-10 NOTE — TELEPHONE ENCOUNTER
Phoned patient and informed her we sent script 3/18/21 90 tabs and 1 refill. Advised she call the pharmacy.

## 2021-05-19 ENCOUNTER — HOSPITAL ENCOUNTER (OUTPATIENT)
Dept: BONE DENSITY | Age: 61
Discharge: HOME | End: 2021-05-19
Attending: FAMILY MEDICINE
Payer: MEDICAID

## 2021-05-19 ENCOUNTER — HOSPITAL ENCOUNTER (OUTPATIENT)
Dept: MAMMOGRAPHY | Age: 61
Discharge: HOME | End: 2021-05-19
Attending: FAMILY MEDICINE
Payer: MEDICAID

## 2021-05-19 DIAGNOSIS — Z12.31 ENCOUNTER FOR SCREENING MAMMOGRAM FOR MALIGNANT NEOPLASM OF BREAST: ICD-10-CM

## 2021-05-19 DIAGNOSIS — Z78.0 POST-MENOPAUSAL: ICD-10-CM

## 2021-05-19 PROCEDURE — 77063 BREAST TOMOSYNTHESIS BI: CPT | Performed by: FAMILY MEDICINE

## 2021-05-19 PROCEDURE — 77080 DXA BONE DENSITY AXIAL: CPT | Performed by: FAMILY MEDICINE

## 2021-05-19 PROCEDURE — 77067 SCR MAMMO BI INCL CAD: CPT | Performed by: FAMILY MEDICINE

## 2021-05-24 ENCOUNTER — PATIENT MESSAGE (OUTPATIENT)
Dept: FAMILY MEDICINE CLINIC | Facility: CLINIC | Age: 61
End: 2021-05-24

## 2021-05-24 NOTE — TELEPHONE ENCOUNTER
From: Saray Sin  To: Vonda Lombardi DO  Sent: 5/23/2021  5:22 PM CDT  Subject: Test Results Question    So Shannon [place] what exactly does all this mean?  Please don’t sugar coat it,tell what going with this module that they found an is this second t

## 2021-05-24 NOTE — TELEPHONE ENCOUNTER
From: Fer Moreno  To: Ben Noriega DO  Sent: 5/24/2021 10:07 AM CDT  Subject: Test Results Question    Yes the mammogram is exactly what I'm referring to.  I want you to explain as simple as possible what's being said about my left breast.

## 2021-06-04 ENCOUNTER — HOSPITAL ENCOUNTER (OUTPATIENT)
Dept: ULTRASOUND IMAGING | Facility: HOSPITAL | Age: 61
Discharge: HOME | End: 2021-06-04
Attending: FAMILY MEDICINE
Payer: MEDICAID

## 2021-06-04 ENCOUNTER — HOSPITAL ENCOUNTER (OUTPATIENT)
Dept: MAMMOGRAPHY | Facility: HOSPITAL | Age: 61
Discharge: HOME | End: 2021-06-04
Attending: FAMILY MEDICINE
Payer: MEDICAID

## 2021-06-04 ENCOUNTER — LAB ENCOUNTER (OUTPATIENT)
Dept: LAB | Facility: HOSPITAL | Age: 61
End: 2021-06-04
Attending: FAMILY MEDICINE
Payer: MEDICAID

## 2021-06-04 DIAGNOSIS — I10 ESSENTIAL HYPERTENSION, BENIGN: ICD-10-CM

## 2021-06-04 DIAGNOSIS — Z00.00 ROUTINE GENERAL MEDICAL EXAMINATION AT A HEALTH CARE FACILITY: ICD-10-CM

## 2021-06-04 DIAGNOSIS — M79.89 MASS OF SOFT TISSUE OF SHOULDER: ICD-10-CM

## 2021-06-04 DIAGNOSIS — E55.9 VITAMIN D DEFICIENCY: ICD-10-CM

## 2021-06-04 DIAGNOSIS — R92.2 INCONCLUSIVE MAMMOGRAM: ICD-10-CM

## 2021-06-04 PROCEDURE — 80061 LIPID PANEL: CPT

## 2021-06-04 PROCEDURE — 84439 ASSAY OF FREE THYROXINE: CPT

## 2021-06-04 PROCEDURE — 36415 COLL VENOUS BLD VENIPUNCTURE: CPT

## 2021-06-04 PROCEDURE — 84443 ASSAY THYROID STIM HORMONE: CPT

## 2021-06-04 PROCEDURE — 76642 ULTRASOUND BREAST LIMITED: CPT | Performed by: FAMILY MEDICINE

## 2021-06-04 PROCEDURE — 85025 COMPLETE CBC W/AUTO DIFF WBC: CPT

## 2021-06-04 PROCEDURE — 77065 DX MAMMO INCL CAD UNI: CPT | Performed by: FAMILY MEDICINE

## 2021-06-04 PROCEDURE — 77061 BREAST TOMOSYNTHESIS UNI: CPT | Performed by: FAMILY MEDICINE

## 2021-06-04 PROCEDURE — 80053 COMPREHEN METABOLIC PANEL: CPT

## 2021-06-04 PROCEDURE — 76882 US LMTD JT/FCL EVL NVASC XTR: CPT | Performed by: FAMILY MEDICINE

## 2021-06-04 PROCEDURE — 82306 VITAMIN D 25 HYDROXY: CPT

## 2021-07-28 ENCOUNTER — OFFICE VISIT (OUTPATIENT)
Dept: FAMILY MEDICINE CLINIC | Facility: CLINIC | Age: 61
End: 2021-07-28
Payer: MEDICAID

## 2021-07-28 VITALS
HEART RATE: 90 BPM | WEIGHT: 196 LBS | DIASTOLIC BLOOD PRESSURE: 80 MMHG | TEMPERATURE: 98 F | SYSTOLIC BLOOD PRESSURE: 130 MMHG | HEIGHT: 61 IN | BODY MASS INDEX: 37 KG/M2 | OXYGEN SATURATION: 98 %

## 2021-07-28 DIAGNOSIS — E87.6 HYPOKALEMIA: ICD-10-CM

## 2021-07-28 DIAGNOSIS — M85.89 OSTEOPENIA OF MULTIPLE SITES: ICD-10-CM

## 2021-07-28 DIAGNOSIS — G89.29 CHRONIC RIGHT SHOULDER PAIN: Primary | ICD-10-CM

## 2021-07-28 DIAGNOSIS — I10 ESSENTIAL HYPERTENSION, BENIGN: ICD-10-CM

## 2021-07-28 DIAGNOSIS — E78.00 HYPERCHOLESTEROLEMIA: ICD-10-CM

## 2021-07-28 DIAGNOSIS — M25.511 CHRONIC RIGHT SHOULDER PAIN: Primary | ICD-10-CM

## 2021-07-28 DIAGNOSIS — M25.561 CHRONIC PAIN OF RIGHT KNEE: ICD-10-CM

## 2021-07-28 DIAGNOSIS — M75.21 BICEPS TENDINITIS OF RIGHT UPPER EXTREMITY: ICD-10-CM

## 2021-07-28 DIAGNOSIS — G89.29 CHRONIC PAIN OF RIGHT KNEE: ICD-10-CM

## 2021-07-28 DIAGNOSIS — R73.9 HYPERGLYCEMIA: ICD-10-CM

## 2021-07-28 PROCEDURE — 3079F DIAST BP 80-89 MM HG: CPT | Performed by: FAMILY MEDICINE

## 2021-07-28 PROCEDURE — 3008F BODY MASS INDEX DOCD: CPT | Performed by: FAMILY MEDICINE

## 2021-07-28 PROCEDURE — 3075F SYST BP GE 130 - 139MM HG: CPT | Performed by: FAMILY MEDICINE

## 2021-07-28 PROCEDURE — 99214 OFFICE O/P EST MOD 30 MIN: CPT | Performed by: FAMILY MEDICINE

## 2021-07-28 NOTE — PROGRESS NOTES
Trell Alfonso is a 63year old female. HPI:       Shoulder pain:  Right. Years. Worsening. At worst 9/10. Worse when she sleeps on her right side or doing chores at home. Alleviating factors:  Rest, Tylenol does not always alleviate anymore. Osteoarthrosis, unspecified whether generalized or localized, unspecified site    • Osteopenia 8/28/2013   • Paresthesia and pain of both upper extremities 3/8/2014   • Rectal bleeding 2/9/2013   • Renal cyst 6/8/2014   • Renal cyst, right 1/27/2017    Upp headaches/dizziness  PSYCH: denies depression and anxiety    There is no immunization history for the selected administration types on file for this patient.     EXAM:   /80 (BP Location: Left arm, Patient Position: Sitting, Cuff Size: adult)   Pulse A/G Ratio      1.0 - 2.0  1.2 1.1    Patient Fasting?        Yes Yes     GFR      >=60    102   Cholesterol, Total      <200 mg/dL 226 (H)  221 (H) 198   Triglycerides      30 - 149 mg/dL 92  87 56   HDL Cholesterol      40 - 59 mg/dL 69 (H)  83 (H) 79 calcium through dietary sources. Weightbearing exercises when able.         Orders Placed This Encounter      Magnesium [E]      Hemoglobin A1C [E]      Basic Metabolic Panel (8) [E]      Imaging & Consults:  ORTHOPEDIC - INTERNAL    Return in about 6 connie

## 2021-07-28 NOTE — PATIENT INSTRUCTIONS
Recommend Mediterranean diet, Ornish diet, DASH diet, or plant-based diet. Also consider below dietary recommendations.           Recommend lean meats such as skinless chicken breast, 90% lean ground beef, lean ground turkey, pork cholesterol. Your healthcare provider can help you identify your personal risk through screenings tests and a discussion about different factors that many increase your chance for heart disease and stroke.  These include family history, age, gender, ethnici numbers, as well as your total cholesterol gives you a more complete picture of your cholesterol level:  · HDL is called the “good” cholesterol. It moves the \"bad\" cholesterol out of the bloodstream and does not block your blood vessels.  HDL levels are a is in addition to eating a healthy diet and getting regular exercise. Statins can help you stay healthy. They can also help prevent a heart attack or stroke. Also ask your provider about any side effects your medicines may cause.  Let your provider know abo eat   · Limiting how many sugar-sweetened beverages you drink  · Limiting how often you eat out  · Limiting alcohol  Getting exercise  Regular exercise is a good way to help your body control cholesterol. Regular exercise can help in many ways.  It can:   · stroke, or cancer  · Stains your teeth  · Makes your skin, clothes, and breath smell bad  · Costs a lot of money  Ask your healthcare provider for medicines or nicotine replacement products to help you quit.    Controlling stress   Learn ways to control str looks at:   · Ages between 40-77 years of age  · Gender  · Race  · Blood pressure (systolic and diastolic measurements)  · Total cholesterol, good cholesterol (HDL), and bad cholesterol (LDL) levels  · History of diabetes  · Current or past smoking history

## 2021-08-10 ENCOUNTER — TELEPHONE (OUTPATIENT)
Dept: ORTHOPEDICS CLINIC | Facility: CLINIC | Age: 61
End: 2021-08-10

## 2021-08-10 DIAGNOSIS — M25.511 RIGHT SHOULDER PAIN, UNSPECIFIED CHRONICITY: ICD-10-CM

## 2021-08-10 DIAGNOSIS — M25.561 RIGHT KNEE PAIN, UNSPECIFIED CHRONICITY: Primary | ICD-10-CM

## 2021-08-10 NOTE — TELEPHONE ENCOUNTER
Patient coming in for rt shoulder pain and rt knee pain. Patient had a Rt shoulder Ultrasound can be viewed in Epic and no imaging on the rt knee . If imaging needed please place Rx.

## 2021-08-10 NOTE — TELEPHONE ENCOUNTER
Requisite for xray order  Reviewed patients chart, xray orders are required.  Order placed for Rt shoulder and knee xrays  • Message also sent to patients mychart. (information for central scheduling provided for self scheduling of xray appt, if patient craig

## 2021-09-08 ENCOUNTER — TELEPHONE (OUTPATIENT)
Dept: ORTHOPEDICS CLINIC | Facility: CLINIC | Age: 61
End: 2021-09-08

## 2021-09-08 NOTE — TELEPHONE ENCOUNTER
Patient states that the Xray order is supposed to be for the RT Knee not the LT. Please create an order for RT Knee instead. Thanks.     Future Appointments   Date Time Provider Kane Molina   10/1/2021 11:00 AM Chloe Killian MD [facility]

## 2021-09-08 NOTE — TELEPHONE ENCOUNTER
Orders Placed This Encounter     Active    XR KNEE, COMPLETE (4 OR MORE VIEWS), RIGHT (GMA=37688) Ordered On: 08/10/2021   XR SHOULDER, COMPLETE (MIN 2 VIEWS), RIGHT (CPT=73030) Ordered On: 08/10/2021     Order for Left knee placed by pt PCP.  Order cancell

## 2021-10-01 ENCOUNTER — HOSPITAL ENCOUNTER (OUTPATIENT)
Dept: GENERAL RADIOLOGY | Age: 61
Discharge: HOME | End: 2021-10-01
Attending: ORTHOPAEDIC SURGERY
Payer: MEDICAID

## 2021-10-01 ENCOUNTER — OFFICE VISIT (OUTPATIENT)
Dept: ORTHOPEDICS CLINIC | Facility: CLINIC | Age: 61
End: 2021-10-01
Payer: MEDICAID

## 2021-10-01 DIAGNOSIS — M25.511 RIGHT SHOULDER PAIN, UNSPECIFIED CHRONICITY: ICD-10-CM

## 2021-10-01 DIAGNOSIS — M25.511 ACUTE PAIN OF BOTH SHOULDERS: Primary | ICD-10-CM

## 2021-10-01 DIAGNOSIS — M25.512 ACUTE PAIN OF BOTH SHOULDERS: Primary | ICD-10-CM

## 2021-10-01 DIAGNOSIS — M25.561 RIGHT KNEE PAIN, UNSPECIFIED CHRONICITY: ICD-10-CM

## 2021-10-01 DIAGNOSIS — Z01.89 ENCOUNTER FOR LOWER EXTREMITY COMPARISON IMAGING STUDY: ICD-10-CM

## 2021-10-01 PROCEDURE — 73564 X-RAY EXAM KNEE 4 OR MORE: CPT | Performed by: ORTHOPAEDIC SURGERY

## 2021-10-01 PROCEDURE — 20610 DRAIN/INJ JOINT/BURSA W/O US: CPT | Performed by: ORTHOPAEDIC SURGERY

## 2021-10-01 PROCEDURE — 73030 X-RAY EXAM OF SHOULDER: CPT | Performed by: ORTHOPAEDIC SURGERY

## 2021-10-01 PROCEDURE — 73562 X-RAY EXAM OF KNEE 3: CPT | Performed by: ORTHOPAEDIC SURGERY

## 2021-10-01 PROCEDURE — 99205 OFFICE O/P NEW HI 60 MIN: CPT | Performed by: ORTHOPAEDIC SURGERY

## 2021-10-01 RX ORDER — TRIAMCINOLONE ACETONIDE 40 MG/ML
40 INJECTION, SUSPENSION INTRA-ARTICULAR; INTRAMUSCULAR ONCE
Status: COMPLETED | OUTPATIENT
Start: 2021-10-01 | End: 2021-10-01

## 2021-10-01 RX ORDER — KETOROLAC TROMETHAMINE 30 MG/ML
30 INJECTION, SOLUTION INTRAMUSCULAR; INTRAVENOUS ONCE
Status: COMPLETED | OUTPATIENT
Start: 2021-10-01 | End: 2021-10-01

## 2021-10-01 RX ADMIN — KETOROLAC TROMETHAMINE 30 MG: 30 INJECTION, SOLUTION INTRAMUSCULAR; INTRAVENOUS at 10:55:00

## 2021-10-01 RX ADMIN — TRIAMCINOLONE ACETONIDE 40 MG: 40 INJECTION, SUSPENSION INTRA-ARTICULAR; INTRAMUSCULAR at 10:55:00

## 2021-10-03 NOTE — H&P
[facility] - ORTHOPEDICS  [facility] [id number]  [phone]     NEW PATIENT VISIT - HISTORY AND PHYSICAL EXAMINATION     Name: Elmira Mercado   MRN: [id number]  Date: 10/1/2021     CC: Lizeth campbell site    • Osteopenia 8/28/2013   • Paresthesia and pain of both upper extremities 3/8/2014   • Rectal bleeding 2/9/2013   • Renal cyst 6/8/2014   • Renal cyst, right 1/27/2017    Upper to midpole 6.7cm on CT done at [address] [place] [address] 1/25/2017 of systems was performed and was negative aside from the aforementioned per history of present illness. SOCIAL HX:  Patient smokes 4 cigarettes a day. Occasionally drinks alcohol. She smokes marijuana daily to help with sleep and shoulder pain.   She l EPL, Finger Abduction, , Pinch, Deltoid  Sensation:   intact to light touch median, ulnar, radial and axillary nerve  Circulation:   Normal, 2+ radial pulse    The contralateral upper extremity is without limitation in range of motion or strength, no p orthopedic evaluation. Patient complains of pain in her Right knee since her Right hip replacement in 2015. She denies injury to her Right knee. Left knee is for comparison. FINDINGS:  The medial and lateral compartments appear maintained.   No signifi changes and inflammatory pain. Imaging was reviewed in detail and correlated to a 3-dimensional model of the shoulder.      I discussed the role of physical therapy, although she was not interested as she has done this in the past.  Regarding the right knee

## 2021-10-03 NOTE — PROCEDURES
Bilateral Shoulder Glenohumeral Joint Injection    Name: Mishel Robldeo   MRN: [id number]  Date: 10/1/2021     Clinical Indications:   Persistent Shoulder pain refractory to conservative measures.      After informed consent, the injection site was marked, st

## 2022-01-28 ENCOUNTER — OFFICE VISIT (OUTPATIENT)
Dept: FAMILY MEDICINE CLINIC | Facility: CLINIC | Age: 62
End: 2022-01-28
Payer: MEDICAID

## 2022-01-28 VITALS
BODY MASS INDEX: 37.38 KG/M2 | SYSTOLIC BLOOD PRESSURE: 126 MMHG | TEMPERATURE: 99 F | HEART RATE: 74 BPM | DIASTOLIC BLOOD PRESSURE: 70 MMHG | OXYGEN SATURATION: 96 % | HEIGHT: 61 IN | WEIGHT: 198 LBS

## 2022-01-28 DIAGNOSIS — G89.29 CHRONIC RIGHT-SIDED THORACIC BACK PAIN: Primary | ICD-10-CM

## 2022-01-28 DIAGNOSIS — E78.00 HYPERCHOLESTEROLEMIA: ICD-10-CM

## 2022-01-28 DIAGNOSIS — E55.9 VITAMIN D DEFICIENCY: ICD-10-CM

## 2022-01-28 DIAGNOSIS — Z71.6 ENCOUNTER FOR SMOKING CESSATION COUNSELING: ICD-10-CM

## 2022-01-28 DIAGNOSIS — F17.210 CIGARETTE SMOKER: ICD-10-CM

## 2022-01-28 DIAGNOSIS — M54.6 CHRONIC RIGHT-SIDED THORACIC BACK PAIN: Primary | ICD-10-CM

## 2022-01-28 DIAGNOSIS — R20.0 NUMBNESS: ICD-10-CM

## 2022-01-28 DIAGNOSIS — E87.6 HYPOKALEMIA: ICD-10-CM

## 2022-01-28 DIAGNOSIS — Z98.890 HISTORY OF CERVICAL SPINAL SURGERY: ICD-10-CM

## 2022-01-28 DIAGNOSIS — I10 ESSENTIAL HYPERTENSION, BENIGN: ICD-10-CM

## 2022-01-28 PROCEDURE — 96127 BRIEF EMOTIONAL/BEHAV ASSMT: CPT | Performed by: FAMILY MEDICINE

## 2022-01-28 PROCEDURE — 3078F DIAST BP <80 MM HG: CPT | Performed by: FAMILY MEDICINE

## 2022-01-28 PROCEDURE — 99214 OFFICE O/P EST MOD 30 MIN: CPT | Performed by: FAMILY MEDICINE

## 2022-01-28 PROCEDURE — 3074F SYST BP LT 130 MM HG: CPT | Performed by: FAMILY MEDICINE

## 2022-01-28 PROCEDURE — 3008F BODY MASS INDEX DOCD: CPT | Performed by: FAMILY MEDICINE

## 2022-01-28 RX ORDER — HYDROCHLOROTHIAZIDE 25 MG/1
25 TABLET ORAL EVERY MORNING
Qty: 90 TABLET | Refills: 3 | Status: SHIPPED | OUTPATIENT
Start: 2022-01-28

## 2022-01-28 NOTE — PROGRESS NOTES
Nick Rowe is a 57year old female. Patient presents with: Other: Per patient she is having pain under the right breast that goes around the right side under the shoulderblade. On going for years.    Smoking: has cut down to 2 or 3 a day, maurizio Osteoarthritis of right hip 8/28/2013   • Osteoarthrosis, unspecified whether generalized or localized, unspecified site    • Osteopenia 8/28/2013   • Paresthesia and pain of both upper extremities 3/8/2014   • Rectal bleeding 2/9/2013   • Renal cyst 6/8/2 CP/palpitations  VASCULAR: Denies LE edema  GI: Denies abdominal pain/nausea/vomiting/blood in stool/black stool/bloating/constipation/diarrhea  : denies urinary symptoms  NEURO: denies headaches/dizziness  PSYCH: denies depression and anxiety      Immun gas.  SPLEEN:  Normal.  KIDNEYS:  Right upper pole simple cyst measures 7.5 x 5.3 x 5.2 cm. Right kidney measures 11.3 cm. Left kidney measures 9.6 cm.   AORTA/IVC:  Normal.     =====  CONCLUSION:  Simple appearing cyst of the right upper pole which is sl 8/22/2020 at 9:35 AM       Finalized by (CST): Kaylin Sewell MD on 8/22/2020 at 9:37 AM            ASSESSMENT AND PLAN:       Chronic right-sided thoracic back pain  (primary encounter diagnosis)  Numbness  History of cervical spinal surgery  [facility] [927][Q]      Meds & Refills for this Visit:  Requested Prescriptions     Signed Prescriptions Disp Refills   • hydroCHLOROthiazide 25 MG Oral Tab 90 tablet 3     Sig: Take 1 tablet (25 mg total) by mouth every morning.        Imaging & Consults:  NEURO - I

## 2022-01-28 NOTE — PATIENT INSTRUCTIONS
Kicking the Smoking Habit  If you smoke, quitting is one of the best changes you can make for your heart and your overall health. Your risk of heart attack goes down within one day of putting out that last cigarette.  As you go longer without smoking, cause the relapse. · Make the most of slip-ups. Try to learn from them, and then get back on track. · Be accountable to your friends and your calendar so that you stay on track. For family and friends  · Be supportive and patient.  Quitting smoking can b

## 2022-04-14 ENCOUNTER — OFFICE VISIT (OUTPATIENT)
Dept: NEUROLOGY | Facility: CLINIC | Age: 62
End: 2022-04-14
Payer: MEDICAID

## 2022-04-14 VITALS
HEIGHT: 61 IN | BODY MASS INDEX: 35.87 KG/M2 | RESPIRATION RATE: 16 BRPM | HEART RATE: 84 BPM | DIASTOLIC BLOOD PRESSURE: 84 MMHG | WEIGHT: 190 LBS | SYSTOLIC BLOOD PRESSURE: 132 MMHG

## 2022-04-14 DIAGNOSIS — Z98.890 HISTORY OF CERVICAL SPINAL SURGERY: ICD-10-CM

## 2022-04-14 DIAGNOSIS — M48.02 CERVICAL SPINAL STENOSIS: ICD-10-CM

## 2022-04-14 DIAGNOSIS — M79.2 THORACIC NEURALGIA: Primary | ICD-10-CM

## 2022-04-14 PROCEDURE — 3075F SYST BP GE 130 - 139MM HG: CPT | Performed by: OTHER

## 2022-04-14 PROCEDURE — 3008F BODY MASS INDEX DOCD: CPT | Performed by: OTHER

## 2022-04-14 PROCEDURE — 99244 OFF/OP CNSLTJ NEW/EST MOD 40: CPT | Performed by: OTHER

## 2022-04-14 PROCEDURE — 3079F DIAST BP 80-89 MM HG: CPT | Performed by: OTHER

## 2022-04-23 ENCOUNTER — TELEPHONE (OUTPATIENT)
Dept: FAMILY MEDICINE CLINIC | Facility: CLINIC | Age: 62
End: 2022-04-23

## 2022-04-25 RX ORDER — HYDROCHLOROTHIAZIDE 25 MG/1
TABLET ORAL
Qty: 90 TABLET | Refills: 3 | OUTPATIENT
Start: 2022-04-25

## 2022-04-25 NOTE — TELEPHONE ENCOUNTER
Spoke to patient and advised the script was filled in January for a year. She said she did speak with the pharmacy and confirmed that but was wondering why we have to say \"denied\". I just advised that is the way we have to do it.   She Vu.

## 2022-05-25 LAB
ABSOLUTE BASOPHILS: 51 CELLS/UL (ref 0–200)
ABSOLUTE EOSINOPHILS: 81 CELLS/UL (ref 15–500)
ABSOLUTE LYMPHOCYTES: 3929 CELLS/UL (ref 850–3900)
ABSOLUTE MONOCYTES: 616 CELLS/UL (ref 200–950)
ABSOLUTE NEUTROPHILS: 5424 CELLS/UL (ref 1500–7800)
ALBUMIN/GLOBULIN RATIO: 1.7 (CALC) (ref 1–2.5)
ALBUMIN: 4.7 G/DL (ref 3.6–5.1)
ALKALINE PHOSPHATASE: 56 U/L (ref 37–153)
ALT: 11 U/L (ref 6–29)
AST: 17 U/L (ref 10–35)
BASOPHILS: 0.5 %
BILIRUBIN, TOTAL: 0.7 MG/DL (ref 0.2–1.2)
BUN: 7 MG/DL (ref 7–25)
CALCIUM: 9.8 MG/DL (ref 8.6–10.4)
CARBON DIOXIDE: 29 MMOL/L (ref 20–32)
CHLORIDE: 101 MMOL/L (ref 98–110)
CHOL/HDLC RATIO: 2.6 (CALC)
CHOLESTEROL, TOTAL: 226 MG/DL
CREATININE: 0.54 MG/DL (ref 0.5–0.99)
EGFR IF AFRICN AM: 117 ML/MIN/1.73M2
EGFR IF NONAFRICN AM: 101 ML/MIN/1.73M2
EOSINOPHILS: 0.8 %
GLOBULIN: 2.8 G/DL (CALC) (ref 1.9–3.7)
GLUCOSE: 109 MG/DL (ref 65–99)
HDL CHOLESTEROL: 88 MG/DL
HEMATOCRIT: 43.1 % (ref 35–45)
HEMOGLOBIN A1C: 4.8 % OF TOTAL HGB
HEMOGLOBIN: 14.6 G/DL (ref 11.7–15.5)
LDL-CHOLESTEROL: 117 MG/DL (CALC)
LYMPHOCYTES: 38.9 %
MAGNESIUM: 2 MG/DL (ref 1.5–2.5)
MCH: 30.5 PG (ref 27–33)
MCHC: 33.9 G/DL (ref 32–36)
MCV: 90 FL (ref 80–100)
MONOCYTES: 6.1 %
MPV: 8.9 FL (ref 7.5–12.5)
NEUTROPHILS: 53.7 %
NON-HDL CHOLESTEROL: 138 MG/DL (CALC)
PLATELET COUNT: 444 THOUSAND/UL (ref 140–400)
POTASSIUM: 3.7 MMOL/L (ref 3.5–5.3)
PROTEIN, TOTAL: 7.5 G/DL (ref 6.1–8.1)
RDW: 13.9 % (ref 11–15)
RED BLOOD CELL COUNT: 4.79 MILLION/UL (ref 3.8–5.1)
SODIUM: 141 MMOL/L (ref 135–146)
T4, FREE: 1.6 NG/DL (ref 0.8–1.8)
TRIGLYCERIDES: 105 MG/DL
TSH: 0.74 MIU/L (ref 0.4–4.5)
VITAMIN B12: 584 PG/ML (ref 200–1100)
VITAMIN D, 25-OH, TOTAL: 38 NG/ML (ref 30–100)
WHITE BLOOD CELL COUNT: 10.1 THOUSAND/UL (ref 3.8–10.8)

## 2022-06-07 ENCOUNTER — HOSPITAL ENCOUNTER (OUTPATIENT)
Dept: MRI IMAGING | Facility: HOSPITAL | Age: 62
Discharge: HOME | End: 2022-06-07
Attending: Other
Payer: MEDICAID

## 2022-06-07 PROCEDURE — 72146 MRI CHEST SPINE W/O DYE: CPT | Performed by: OTHER

## 2022-07-29 ENCOUNTER — OFFICE VISIT (OUTPATIENT)
Dept: FAMILY MEDICINE CLINIC | Facility: CLINIC | Age: 62
End: 2022-07-29
Payer: MEDICAID

## 2022-07-29 VITALS
BODY MASS INDEX: 36.06 KG/M2 | OXYGEN SATURATION: 98 % | DIASTOLIC BLOOD PRESSURE: 78 MMHG | WEIGHT: 191 LBS | TEMPERATURE: 98 F | HEART RATE: 79 BPM | SYSTOLIC BLOOD PRESSURE: 132 MMHG | RESPIRATION RATE: 18 BRPM | HEIGHT: 61 IN

## 2022-07-29 DIAGNOSIS — G89.29 CHRONIC RIGHT-SIDED THORACIC BACK PAIN: ICD-10-CM

## 2022-07-29 DIAGNOSIS — M54.6 CHRONIC RIGHT-SIDED THORACIC BACK PAIN: ICD-10-CM

## 2022-07-29 DIAGNOSIS — Z71.6 ENCOUNTER FOR SMOKING CESSATION COUNSELING: ICD-10-CM

## 2022-07-29 DIAGNOSIS — Z00.00 ROUTINE GENERAL MEDICAL EXAMINATION AT A HEALTH CARE FACILITY: Primary | ICD-10-CM

## 2022-07-29 DIAGNOSIS — F17.210 CIGARETTE SMOKER: ICD-10-CM

## 2022-07-29 DIAGNOSIS — R05.3 CHRONIC COUGH: ICD-10-CM

## 2022-07-29 DIAGNOSIS — Z12.31 ENCOUNTER FOR SCREENING MAMMOGRAM FOR MALIGNANT NEOPLASM OF BREAST: ICD-10-CM

## 2022-07-29 DIAGNOSIS — E66.01 CLASS 2 SEVERE OBESITY DUE TO EXCESS CALORIES WITH SERIOUS COMORBIDITY AND BODY MASS INDEX (BMI) OF 36.0 TO 36.9 IN ADULT (HCC): ICD-10-CM

## 2022-07-29 DIAGNOSIS — E78.00 HYPERCHOLESTEROLEMIA: ICD-10-CM

## 2022-07-29 PROBLEM — R63.4 ABNORMAL WEIGHT LOSS: Status: ACTIVE | Noted: 2022-07-29

## 2022-07-29 PROBLEM — R93.3 IMAGING OF GASTROINTESTINAL TRACT ABNORMAL: Status: ACTIVE | Noted: 2022-07-29

## 2022-07-29 PROBLEM — E66.812 CLASS 2 SEVERE OBESITY DUE TO EXCESS CALORIES WITH SERIOUS COMORBIDITY AND BODY MASS INDEX (BMI) OF 36.0 TO 36.9 IN ADULT (HCC): Status: ACTIVE | Noted: 2022-07-29

## 2022-07-29 PROCEDURE — 99396 PREV VISIT EST AGE 40-64: CPT | Performed by: FAMILY MEDICINE

## 2022-07-29 PROCEDURE — 3075F SYST BP GE 130 - 139MM HG: CPT | Performed by: FAMILY MEDICINE

## 2022-07-29 PROCEDURE — 3078F DIAST BP <80 MM HG: CPT | Performed by: FAMILY MEDICINE

## 2022-07-29 PROCEDURE — 99214 OFFICE O/P EST MOD 30 MIN: CPT | Performed by: FAMILY MEDICINE

## 2022-07-29 PROCEDURE — 3008F BODY MASS INDEX DOCD: CPT | Performed by: FAMILY MEDICINE

## 2022-07-29 RX ORDER — IPRATROPIUM/ALBUTEROL SULFATE 20-100 MCG
1 MIST INHALER (GRAM) INHALATION EVERY 4 HOURS PRN
Qty: 3 EACH | Refills: 0 | Status: SHIPPED | OUTPATIENT
Start: 2022-07-29

## 2022-09-15 ENCOUNTER — MED REC SCAN ONLY (OUTPATIENT)
Dept: FAMILY MEDICINE CLINIC | Facility: CLINIC | Age: 62
End: 2022-09-15

## 2022-09-15 ENCOUNTER — HOSPITAL ENCOUNTER (OUTPATIENT)
Dept: MAMMOGRAPHY | Age: 62
Discharge: HOME | End: 2022-09-15
Attending: FAMILY MEDICINE
Payer: MEDICAID

## 2022-09-15 DIAGNOSIS — Z12.31 ENCOUNTER FOR SCREENING MAMMOGRAM FOR MALIGNANT NEOPLASM OF BREAST: ICD-10-CM

## 2022-09-15 PROCEDURE — 77067 SCR MAMMO BI INCL CAD: CPT | Performed by: FAMILY MEDICINE

## 2022-09-15 PROCEDURE — 77063 BREAST TOMOSYNTHESIS BI: CPT | Performed by: FAMILY MEDICINE

## 2022-09-16 ENCOUNTER — TELEPHONE (OUTPATIENT)
Dept: FAMILY MEDICINE CLINIC | Facility: CLINIC | Age: 62
End: 2022-09-16

## 2022-09-16 DIAGNOSIS — R92.2 DENSE BREAST TISSUE ON MAMMOGRAM: ICD-10-CM

## 2022-09-16 DIAGNOSIS — M25.511 CHRONIC RIGHT SHOULDER PAIN: ICD-10-CM

## 2022-09-16 DIAGNOSIS — G89.29 CHRONIC RIGHT SHOULDER PAIN: ICD-10-CM

## 2022-09-16 DIAGNOSIS — R93.89 DYSTROPHIC RADIOLOGIC CALCIFICATION: Primary | ICD-10-CM

## 2022-09-16 NOTE — TELEPHONE ENCOUNTER
Patient calling states had mammogram yesterday and was told she needs further testing     Requesting order please call patient

## 2022-09-18 NOTE — TELEPHONE ENCOUNTER
Please inform patient that the dense breast tissue of both breasts, (which is sounds like the radiology department has explained to her), can obscure small masses, additionally mammographically they describe dystrophic calcifications and that it is reassuring that the imaging is stable and reported as unchanged compared to the last imaging. A nuclear medicine MBI of bilateral breasts has been ordered, if nucear medicine MBI does not report anything suspicious then I would recommend continuing screening mammograms every 12 months but in between the screening mammograms do a full breast ultrasound of both breasts. Inform patient that the nuclear medicine MBI of bilateral breasts has been ordered. Please instruct patient if she does not hear from the referral department in 3 to 5 days regarding authorization of this nuclear medicine test to call our office to let us know so we can find out if it was approved or not. If this nuclear medicine test is not approved, would order full bilateral breast ultrasound.

## 2022-09-21 NOTE — TELEPHONE ENCOUNTER
Patient informed, verbalized understanding. Requested new referral to Dr. Melia Franklin for bilateral shoulder pain, new referral  order placed.

## 2022-10-05 ENCOUNTER — OFFICE VISIT (OUTPATIENT)
Dept: ORTHOPEDICS CLINIC | Facility: CLINIC | Age: 62
End: 2022-10-05
Payer: MEDICAID

## 2022-10-05 DIAGNOSIS — M75.01 ADHESIVE CAPSULITIS OF BOTH SHOULDERS: Primary | ICD-10-CM

## 2022-10-05 DIAGNOSIS — M75.02 ADHESIVE CAPSULITIS OF BOTH SHOULDERS: Primary | ICD-10-CM

## 2022-10-05 DIAGNOSIS — M19.019 GLENOHUMERAL ARTHRITIS: ICD-10-CM

## 2022-10-05 PROCEDURE — 99214 OFFICE O/P EST MOD 30 MIN: CPT | Performed by: ORTHOPAEDIC SURGERY

## 2022-10-05 PROCEDURE — 20610 DRAIN/INJ JOINT/BURSA W/O US: CPT | Performed by: ORTHOPAEDIC SURGERY

## 2022-10-05 RX ORDER — TRIAMCINOLONE ACETONIDE 40 MG/ML
40 INJECTION, SUSPENSION INTRA-ARTICULAR; INTRAMUSCULAR ONCE
Status: COMPLETED | OUTPATIENT
Start: 2022-10-05 | End: 2022-10-05

## 2022-10-05 RX ORDER — KETOROLAC TROMETHAMINE 30 MG/ML
30 INJECTION, SOLUTION INTRAMUSCULAR; INTRAVENOUS ONCE
Status: COMPLETED | OUTPATIENT
Start: 2022-10-05 | End: 2022-10-05

## 2022-10-05 RX ADMIN — TRIAMCINOLONE ACETONIDE 40 MG: 40 INJECTION, SUSPENSION INTRA-ARTICULAR; INTRAMUSCULAR at 11:45:00

## 2022-10-05 RX ADMIN — KETOROLAC TROMETHAMINE 30 MG: 30 INJECTION, SOLUTION INTRAMUSCULAR; INTRAVENOUS at 11:45:00

## 2022-10-08 NOTE — PROCEDURES
Bilateral Shoulder Glenohumeral Joint Injection    Name: Mae Parekh   MRN: [id number]  Date: 10/5/2022     Clinical Indications:   Adhesive Capsulitis. After informed consent, the injection site was marked, sterilized with topical chlorhexidine antiseptic, and locally anesthetized with skin refrigerant. The patient was seated upright and the shoulder was exposed. Using sterile technique: 0.5 mL of 30mg/mL of Ketorolac, 2 mL of 0.5% Bupivicaine, 2 mL of 1% Lidocaine, and 1 mg of 40mg/mL of Triamcinolone (Kenalog) was injected with a Anterior approach utilizing a 21 gauge needle. A band-aid was applied. The patient tolerated the procedure well. This was repeated on both sides. Disposition:   Return to clinic on an as needed basis. Ruthy Casey. Nakita Taylor MD  Knee, Shoulder, & Elbow Surgery / Sports Medicine Specialist  [facility] Orthopaedic Surgery  [place] [address], [address], [address] [facility]. [email or web address]  t: [phone]  o: [phone]  f: [phone]

## 2022-10-28 ENCOUNTER — TELEPHONE (OUTPATIENT)
Dept: FAMILY MEDICINE CLINIC | Facility: CLINIC | Age: 62
End: 2022-10-28

## 2022-10-28 NOTE — TELEPHONE ENCOUNTER
Patient had sudden onset of right knee pain on 10/22/22. States she woke up on that Saturday with right knee pain and has had trouble weight bearing. States initially the knee was swollen but has since gone down with ice and heat. Denied any calf swelling or pain when I asked her. Does not remember injuring it. Please advise if ok for overbook acute visit on Monday?

## 2022-10-28 NOTE — TELEPHONE ENCOUNTER
Pt calling c/o right knee and calf pain that started 10/22 knee is swollen and very painful     Requesting appt with Dr Brian Gonzalez

## 2022-10-31 NOTE — TELEPHONE ENCOUNTER
Phoned patient. She is unable to find transportation today. Appointment made for Friday. She states knee is improving but remains sore.

## 2022-11-14 ENCOUNTER — TELEPHONE (OUTPATIENT)
Dept: FAMILY MEDICINE CLINIC | Facility: CLINIC | Age: 62
End: 2022-11-14

## 2022-11-14 NOTE — TELEPHONE ENCOUNTER
Pt called states she is concerned about hydrochlorothiazide recall. She state she wants to know if dr aware of recall and if she is safe to take it    She states she is on single pack.

## 2023-01-23 DIAGNOSIS — I10 ESSENTIAL HYPERTENSION, BENIGN: ICD-10-CM

## 2023-01-24 RX ORDER — HYDROCHLOROTHIAZIDE 25 MG/1
25 TABLET ORAL EVERY MORNING
Qty: 90 TABLET | Refills: 0 | Status: SHIPPED | OUTPATIENT
Start: 2023-01-24

## 2023-03-13 DIAGNOSIS — R05.3 CHRONIC COUGH: ICD-10-CM

## 2023-03-13 DIAGNOSIS — F17.210 CIGARETTE SMOKER: ICD-10-CM

## 2023-03-14 ENCOUNTER — TELEPHONE (OUTPATIENT)
Dept: FAMILY MEDICINE CLINIC | Facility: CLINIC | Age: 63
End: 2023-03-14

## 2023-03-14 RX ORDER — IPRATROPIUM/ALBUTEROL SULFATE 20-100 MCG
MIST INHALER (GRAM) INHALATION
Qty: 12 G | Refills: 0 | Status: SHIPPED | OUTPATIENT
Start: 2023-03-14

## 2023-03-14 NOTE — TELEPHONE ENCOUNTER
Automated text sent to patient for overdue immunizations. Patient doesn't immunize, patient advised to disregard.

## 2023-05-18 ENCOUNTER — TELEPHONE (OUTPATIENT)
Facility: LOCATION | Age: 63
End: 2023-05-18

## 2023-05-18 ENCOUNTER — TELEPHONE (OUTPATIENT)
Dept: FAMILY MEDICINE CLINIC | Facility: CLINIC | Age: 63
End: 2023-05-18

## 2023-05-18 ENCOUNTER — OFFICE VISIT (OUTPATIENT)
Dept: FAMILY MEDICINE CLINIC | Facility: CLINIC | Age: 63
End: 2023-05-18
Payer: MEDICAID

## 2023-05-18 VITALS
SYSTOLIC BLOOD PRESSURE: 136 MMHG | OXYGEN SATURATION: 98 % | WEIGHT: 184.38 LBS | HEART RATE: 74 BPM | HEIGHT: 61 IN | TEMPERATURE: 99 F | BODY MASS INDEX: 34.81 KG/M2 | RESPIRATION RATE: 18 BRPM | DIASTOLIC BLOOD PRESSURE: 66 MMHG

## 2023-05-18 DIAGNOSIS — M25.361 KNEE INSTABILITY, RIGHT: ICD-10-CM

## 2023-05-18 DIAGNOSIS — I10 ESSENTIAL HYPERTENSION, BENIGN: ICD-10-CM

## 2023-05-18 DIAGNOSIS — R73.01 IMPAIRED FASTING GLUCOSE: ICD-10-CM

## 2023-05-18 DIAGNOSIS — D17.21 LIPOMA OF RIGHT UPPER EXTREMITY: ICD-10-CM

## 2023-05-18 DIAGNOSIS — F17.210 CIGARETTE SMOKER: ICD-10-CM

## 2023-05-18 DIAGNOSIS — M25.561 ACUTE PAIN OF RIGHT KNEE: Primary | ICD-10-CM

## 2023-05-18 DIAGNOSIS — Z71.6 ENCOUNTER FOR SMOKING CESSATION COUNSELING: ICD-10-CM

## 2023-05-18 PROCEDURE — 3008F BODY MASS INDEX DOCD: CPT | Performed by: FAMILY MEDICINE

## 2023-05-18 PROCEDURE — 3075F SYST BP GE 130 - 139MM HG: CPT | Performed by: FAMILY MEDICINE

## 2023-05-18 PROCEDURE — 99214 OFFICE O/P EST MOD 30 MIN: CPT | Performed by: FAMILY MEDICINE

## 2023-05-18 PROCEDURE — 3078F DIAST BP <80 MM HG: CPT | Performed by: FAMILY MEDICINE

## 2023-05-18 NOTE — TELEPHONE ENCOUNTER
Patient requesting a call back from nurse, states she received an order for xray of knee, however does not know if for whole leg or only knee.  Please advise

## 2023-05-18 NOTE — TELEPHONE ENCOUNTER
Per Dr Salinas Daughters, called pt to make appointment for anterior R shoulder lipoma with any of our Drs. Pt saw PBP in August 2014.  Left message

## 2023-05-18 NOTE — TELEPHONE ENCOUNTER
Patient was referred to [facility] orthopedic surgeon Dr. Olivier Gregorio to determine what x-rays may be indicated, no x-rays were ordered at the time of her office visit on 5/18/2023.

## 2023-05-20 PROBLEM — D17.21 LIPOMA OF RIGHT UPPER EXTREMITY: Status: ACTIVE | Noted: 2023-05-20

## 2023-05-20 PROBLEM — D75.839 THROMBOCYTOSIS: Status: ACTIVE | Noted: 2023-05-20

## 2023-05-20 PROBLEM — R73.01 IMPAIRED FASTING GLUCOSE: Status: ACTIVE | Noted: 2023-05-20

## 2023-05-20 PROBLEM — M25.361 KNEE INSTABILITY, RIGHT: Status: ACTIVE | Noted: 2023-05-20

## 2023-05-21 RX ORDER — HYDROCHLOROTHIAZIDE 25 MG/1
25 TABLET ORAL EVERY MORNING
Qty: 90 TABLET | Refills: 0 | Status: SHIPPED | OUTPATIENT
Start: 2023-05-21

## 2023-05-23 ENCOUNTER — OFFICE VISIT (OUTPATIENT)
Facility: LOCATION | Age: 63
End: 2023-05-23
Payer: MEDICAID

## 2023-05-23 VITALS — HEART RATE: 88 BPM | TEMPERATURE: 99 F

## 2023-05-23 DIAGNOSIS — R22.31 ARM MASS, RIGHT: Primary | ICD-10-CM

## 2023-06-02 ENCOUNTER — LABORATORY ENCOUNTER (OUTPATIENT)
Dept: LAB | Age: 63
End: 2023-06-02
Attending: STUDENT IN AN ORGANIZED HEALTH CARE EDUCATION/TRAINING PROGRAM
Payer: MEDICAID

## 2023-06-02 DIAGNOSIS — Z01.818 PRE-OP TESTING: ICD-10-CM

## 2023-06-02 LAB
ALBUMIN SERPL-MCNC: 4 G/DL (ref 3.4–5)
ALBUMIN/GLOB SERPL: 1.1 {RATIO} (ref 1–2)
ALP LIVER SERPL-CCNC: 55 U/L
ALT SERPL-CCNC: 23 U/L
ANION GAP SERPL CALC-SCNC: 4 MMOL/L (ref 0–18)
AST SERPL-CCNC: 20 U/L (ref 15–37)
BASOPHILS # BLD AUTO: 0.07 X10(3) UL (ref 0–0.2)
BASOPHILS NFR BLD AUTO: 0.6 %
BILIRUB SERPL-MCNC: 0.8 MG/DL (ref 0.1–2)
BUN BLD-MCNC: 9 MG/DL (ref 7–18)
CALCIUM BLD-MCNC: 9.1 MG/DL (ref 8.5–10.1)
CHLORIDE SERPL-SCNC: 101 MMOL/L (ref 98–112)
CHOLEST SERPL-MCNC: 195 MG/DL (ref ?–200)
CO2 SERPL-SCNC: 30 MMOL/L (ref 21–32)
CREAT BLD-MCNC: 0.66 MG/DL
EOSINOPHIL # BLD AUTO: 0.1 X10(3) UL (ref 0–0.7)
EOSINOPHIL NFR BLD AUTO: 0.9 %
ERYTHROCYTE [DISTWIDTH] IN BLOOD BY AUTOMATED COUNT: 15.1 %
EST. AVERAGE GLUCOSE BLD GHB EST-MCNC: 97 MG/DL (ref 68–126)
FASTING PATIENT LIPID ANSWER: YES
FASTING STATUS PATIENT QL REPORTED: YES
GFR SERPLBLD BASED ON 1.73 SQ M-ARVRAT: 99 ML/MIN/1.73M2 (ref 60–?)
GLOBULIN PLAS-MCNC: 3.7 G/DL (ref 2.8–4.4)
GLUCOSE BLD-MCNC: 114 MG/DL (ref 70–99)
HBA1C MFR BLD: 5 % (ref ?–5.7)
HCT VFR BLD AUTO: 42.8 %
HDLC SERPL-MCNC: 94 MG/DL (ref 40–59)
HGB BLD-MCNC: 14.3 G/DL
IMM GRANULOCYTES # BLD AUTO: 0.04 X10(3) UL (ref 0–1)
IMM GRANULOCYTES NFR BLD: 0.4 %
LDLC SERPL CALC-MCNC: 83 MG/DL (ref ?–100)
LYMPHOCYTES # BLD AUTO: 4.79 X10(3) UL (ref 1–4)
LYMPHOCYTES NFR BLD AUTO: 42.7 %
MCH RBC QN AUTO: 30.2 PG (ref 26–34)
MCHC RBC AUTO-ENTMCNC: 33.4 G/DL (ref 31–37)
MCV RBC AUTO: 90.3 FL
MONOCYTES # BLD AUTO: 0.64 X10(3) UL (ref 0.1–1)
MONOCYTES NFR BLD AUTO: 5.7 %
NEUTROPHILS # BLD AUTO: 5.59 X10 (3) UL (ref 1.5–7.7)
NEUTROPHILS # BLD AUTO: 5.59 X10(3) UL (ref 1.5–7.7)
NEUTROPHILS NFR BLD AUTO: 49.7 %
NONHDLC SERPL-MCNC: 101 MG/DL (ref ?–130)
OSMOLALITY SERPL CALC.SUM OF ELEC: 280 MOSM/KG (ref 275–295)
PLATELET # BLD AUTO: 444 10(3)UL (ref 150–450)
POTASSIUM SERPL-SCNC: 3 MMOL/L (ref 3.5–5.1)
PROT SERPL-MCNC: 7.7 G/DL (ref 6.4–8.2)
RBC # BLD AUTO: 4.74 X10(6)UL
SODIUM SERPL-SCNC: 135 MMOL/L (ref 136–145)
T4 FREE SERPL-MCNC: 1.2 NG/DL (ref 0.8–1.7)
TRIGL SERPL-MCNC: 102 MG/DL (ref 30–149)
TSI SER-ACNC: 0.56 MIU/ML (ref 0.36–3.74)
VLDLC SERPL CALC-MCNC: 16 MG/DL (ref 0–30)
WBC # BLD AUTO: 11.2 X10(3) UL (ref 4–11)

## 2023-06-02 PROCEDURE — 93010 ELECTROCARDIOGRAM REPORT: CPT | Performed by: INTERNAL MEDICINE

## 2023-06-02 PROCEDURE — 83036 HEMOGLOBIN GLYCOSYLATED A1C: CPT | Performed by: FAMILY MEDICINE

## 2023-06-02 PROCEDURE — 36415 COLL VENOUS BLD VENIPUNCTURE: CPT | Performed by: FAMILY MEDICINE

## 2023-06-02 PROCEDURE — 93005 ELECTROCARDIOGRAM TRACING: CPT

## 2023-06-02 PROCEDURE — 80061 LIPID PANEL: CPT | Performed by: FAMILY MEDICINE

## 2023-06-02 PROCEDURE — 84439 ASSAY OF FREE THYROXINE: CPT | Performed by: FAMILY MEDICINE

## 2023-06-02 PROCEDURE — 80053 COMPREHEN METABOLIC PANEL: CPT | Performed by: FAMILY MEDICINE

## 2023-06-02 PROCEDURE — 84443 ASSAY THYROID STIM HORMONE: CPT | Performed by: FAMILY MEDICINE

## 2023-06-02 PROCEDURE — 85025 COMPLETE CBC W/AUTO DIFF WBC: CPT | Performed by: FAMILY MEDICINE

## 2023-06-03 LAB
ATRIAL RATE: 72 BPM
P AXIS: 69 DEGREES
P-R INTERVAL: 136 MS
Q-T INTERVAL: 434 MS
QRS DURATION: 74 MS
QTC CALCULATION (BEZET): 475 MS
R AXIS: 59 DEGREES
T AXIS: 66 DEGREES
VENTRICULAR RATE: 72 BPM

## 2023-06-05 ENCOUNTER — ANESTHESIA EVENT (OUTPATIENT)
Dept: SURGERY | Facility: HOSPITAL | Age: 63
End: 2023-06-05
Payer: MEDICAID

## 2023-06-06 ENCOUNTER — TELEPHONE (OUTPATIENT)
Facility: LOCATION | Age: 63
End: 2023-06-06

## 2023-06-06 NOTE — TELEPHONE ENCOUNTER
Transaction ID: [id number] ID: [id number] Date: 2023-06-06  Minal Grant Patient  Member ID  [id number]    Date of Birth  [birth year]    Gender  Female    Transaction Type  Outpatient Authorization    Organization  [address] [place] [address]    Payer  [employer] [facility] logo     Certificate Information  Reference Number  [id number]    Status  NO ACTION REQUIRED    Message  Requested Service does not require preauthorization. We would strongly encourage you to check benefits for this service.     Member Information  Patient Name  Minal Grant    Patient Date of Birth  [birth year]    Patient Gender  Female    Member ID  [id number]    Relationship to [address] Name  Minal Grant    Requesting Provider     Name  [address] [place] [address]  [id number]    Tax Id  [id number]    Specialty  [id number]  Provider Role  Provider    Address  [facility] [address], [address] [facility], [address]    Phone  [phone]  Fax  [phone]    Contact Name  [address] [place] [address]  Service Type  2 - Surgical    Place of Service  [address] [place] [address]    Service From - To Date  2023-06-06 - 2023-09-06    Level of Service  Elective    Diagnosis Code 1   - Localized swelling mass and lump right upper limb    Procedure Code 1 (CPT/HCPCS)  78994 - EXC ARM/ELBOW LES SC 3 CM/>    Quantity  1 Units    Status  NO ACTION REQUIRED    Rendering Provider/Facility     Provider 1  Name  [address] [place] [address]  [id number]    Specialty  [id number]  Provider Role  Attending    Address  [facility] [address], [address] [facility], [address]    Fax  [phone]    Provider 2  Name  [facility]  [id number]    Provider Role  Facility    Address  [address] [place] [address], [address] [facility], [address]

## 2023-06-09 ENCOUNTER — HOSPITAL ENCOUNTER (OUTPATIENT)
Facility: HOSPITAL | Age: 63
Setting detail: HOSPITAL OUTPATIENT SURGERY
Discharge: HOME | End: 2023-06-09
Attending: STUDENT IN AN ORGANIZED HEALTH CARE EDUCATION/TRAINING PROGRAM | Admitting: STUDENT IN AN ORGANIZED HEALTH CARE EDUCATION/TRAINING PROGRAM
Payer: MEDICAID

## 2023-06-09 ENCOUNTER — ANESTHESIA (OUTPATIENT)
Dept: SURGERY | Facility: HOSPITAL | Age: 63
End: 2023-06-09
Payer: MEDICAID

## 2023-06-09 VITALS
OXYGEN SATURATION: 97 % | HEART RATE: 85 BPM | WEIGHT: 189 LBS | HEIGHT: 62 IN | BODY MASS INDEX: 34.78 KG/M2 | SYSTOLIC BLOOD PRESSURE: 118 MMHG | RESPIRATION RATE: 19 BRPM | TEMPERATURE: 98 F | DIASTOLIC BLOOD PRESSURE: 79 MMHG

## 2023-06-09 DIAGNOSIS — R22.31 ARM MASS, RIGHT: ICD-10-CM

## 2023-06-09 DIAGNOSIS — Z01.818 PRE-OP TESTING: Primary | ICD-10-CM

## 2023-06-09 LAB
ANION GAP SERPL CALC-SCNC: 5 MMOL/L (ref 0–18)
BUN BLD-MCNC: 5 MG/DL (ref 7–18)
CALCIUM BLD-MCNC: 9.4 MG/DL (ref 8.5–10.1)
CHLORIDE SERPL-SCNC: 104 MMOL/L (ref 98–112)
CO2 SERPL-SCNC: 30 MMOL/L (ref 21–32)
CREAT BLD-MCNC: 0.48 MG/DL
GFR SERPLBLD BASED ON 1.73 SQ M-ARVRAT: 106 ML/MIN/1.73M2 (ref 60–?)
GLUCOSE BLD-MCNC: 103 MG/DL (ref 70–99)
OSMOLALITY SERPL CALC.SUM OF ELEC: 286 MOSM/KG (ref 275–295)
POTASSIUM SERPL-SCNC: 2.9 MMOL/L (ref 3.5–5.1)
SODIUM SERPL-SCNC: 139 MMOL/L (ref 136–145)

## 2023-06-09 PROCEDURE — 0JBD0ZZ EXCISION OF RIGHT UPPER ARM SUBCUTANEOUS TISSUE AND FASCIA, OPEN APPROACH: ICD-10-PCS | Performed by: STUDENT IN AN ORGANIZED HEALTH CARE EDUCATION/TRAINING PROGRAM

## 2023-06-09 PROCEDURE — 24075 EXC ARM/ELBOW LES SC < 3 CM: CPT | Performed by: STUDENT IN AN ORGANIZED HEALTH CARE EDUCATION/TRAINING PROGRAM

## 2023-06-09 RX ORDER — CEFAZOLIN SODIUM/WATER 2 G/20 ML
2 SYRINGE (ML) INTRAVENOUS ONCE
Status: COMPLETED | OUTPATIENT
Start: 2023-06-09 | End: 2023-06-09

## 2023-06-09 RX ORDER — ONDANSETRON 2 MG/ML
4 INJECTION INTRAMUSCULAR; INTRAVENOUS EVERY 6 HOURS PRN
Status: DISCONTINUED | OUTPATIENT
Start: 2023-06-09 | End: 2023-06-09

## 2023-06-09 RX ORDER — HYDROMORPHONE HYDROCHLORIDE 1 MG/ML
0.6 INJECTION, SOLUTION INTRAMUSCULAR; INTRAVENOUS; SUBCUTANEOUS EVERY 5 MIN PRN
Status: DISCONTINUED | OUTPATIENT
Start: 2023-06-09 | End: 2023-06-09

## 2023-06-09 RX ORDER — ONDANSETRON 2 MG/ML
INJECTION INTRAMUSCULAR; INTRAVENOUS AS NEEDED
Status: DISCONTINUED | OUTPATIENT
Start: 2023-06-09 | End: 2023-06-09 | Stop reason: SURG

## 2023-06-09 RX ORDER — POTASSIUM CHLORIDE 14.9 MG/ML
20 INJECTION INTRAVENOUS ONCE
Status: DISCONTINUED | OUTPATIENT
Start: 2023-06-09 | End: 2023-06-09 | Stop reason: HOSPADM

## 2023-06-09 RX ORDER — HYDROCODONE BITARTRATE AND ACETAMINOPHEN 5; 325 MG/1; MG/1
1-2 TABLET ORAL EVERY 6 HOURS PRN
Qty: 4 TABLET | Refills: 0 | Status: SHIPPED | OUTPATIENT
Start: 2023-06-09

## 2023-06-09 RX ORDER — PROCHLORPERAZINE EDISYLATE 5 MG/ML
5 INJECTION INTRAMUSCULAR; INTRAVENOUS EVERY 8 HOURS PRN
Status: DISCONTINUED | OUTPATIENT
Start: 2023-06-09 | End: 2023-06-09

## 2023-06-09 RX ORDER — HEPARIN SODIUM 5000 [USP'U]/ML
5000 INJECTION, SOLUTION INTRAVENOUS; SUBCUTANEOUS ONCE
Status: COMPLETED | OUTPATIENT
Start: 2023-06-09 | End: 2023-06-09

## 2023-06-09 RX ORDER — HYDROCODONE BITARTRATE AND ACETAMINOPHEN 5; 325 MG/1; MG/1
2 TABLET ORAL ONCE AS NEEDED
Status: DISCONTINUED | OUTPATIENT
Start: 2023-06-09 | End: 2023-06-09

## 2023-06-09 RX ORDER — SODIUM CHLORIDE, SODIUM LACTATE, POTASSIUM CHLORIDE, CALCIUM CHLORIDE 600; 310; 30; 20 MG/100ML; MG/100ML; MG/100ML; MG/100ML
INJECTION, SOLUTION INTRAVENOUS CONTINUOUS
Status: DISCONTINUED | OUTPATIENT
Start: 2023-06-09 | End: 2023-06-09

## 2023-06-09 RX ORDER — ACETAMINOPHEN 500 MG
1000 TABLET ORAL ONCE AS NEEDED
Status: DISCONTINUED | OUTPATIENT
Start: 2023-06-09 | End: 2023-06-09

## 2023-06-09 RX ORDER — ACETAMINOPHEN 500 MG
1000 TABLET ORAL ONCE
Status: DISCONTINUED | OUTPATIENT
Start: 2023-06-09 | End: 2023-06-09 | Stop reason: HOSPADM

## 2023-06-09 RX ORDER — SCOLOPAMINE TRANSDERMAL SYSTEM 1 MG/1
1 PATCH, EXTENDED RELEASE TRANSDERMAL ONCE
Status: DISCONTINUED | OUTPATIENT
Start: 2023-06-09 | End: 2023-06-09 | Stop reason: HOSPADM

## 2023-06-09 RX ORDER — SODIUM CHLORIDE AND POTASSIUM CHLORIDE 150; 900 MG/100ML; MG/100ML
INJECTION, SOLUTION INTRAVENOUS CONTINUOUS
Status: DISCONTINUED | OUTPATIENT
Start: 2023-06-09 | End: 2023-06-09

## 2023-06-09 RX ORDER — MIDAZOLAM HYDROCHLORIDE 1 MG/ML
INJECTION INTRAMUSCULAR; INTRAVENOUS AS NEEDED
Status: DISCONTINUED | OUTPATIENT
Start: 2023-06-09 | End: 2023-06-09 | Stop reason: SURG

## 2023-06-09 RX ORDER — BUPIVACAINE HYDROCHLORIDE 2.5 MG/ML
INJECTION, SOLUTION EPIDURAL; INFILTRATION; INTRACAUDAL AS NEEDED
Status: DISCONTINUED | OUTPATIENT
Start: 2023-06-09 | End: 2023-06-09 | Stop reason: HOSPADM

## 2023-06-09 RX ORDER — HYDROMORPHONE HYDROCHLORIDE 1 MG/ML
0.4 INJECTION, SOLUTION INTRAMUSCULAR; INTRAVENOUS; SUBCUTANEOUS EVERY 5 MIN PRN
Status: DISCONTINUED | OUTPATIENT
Start: 2023-06-09 | End: 2023-06-09

## 2023-06-09 RX ORDER — DEXAMETHASONE SODIUM PHOSPHATE 4 MG/ML
VIAL (ML) INJECTION AS NEEDED
Status: DISCONTINUED | OUTPATIENT
Start: 2023-06-09 | End: 2023-06-09 | Stop reason: SURG

## 2023-06-09 RX ORDER — LIDOCAINE HYDROCHLORIDE 10 MG/ML
INJECTION, SOLUTION EPIDURAL; INFILTRATION; INTRACAUDAL; PERINEURAL AS NEEDED
Status: DISCONTINUED | OUTPATIENT
Start: 2023-06-09 | End: 2023-06-09 | Stop reason: SURG

## 2023-06-09 RX ORDER — HYDROMORPHONE HYDROCHLORIDE 1 MG/ML
0.2 INJECTION, SOLUTION INTRAMUSCULAR; INTRAVENOUS; SUBCUTANEOUS EVERY 5 MIN PRN
Status: DISCONTINUED | OUTPATIENT
Start: 2023-06-09 | End: 2023-06-09

## 2023-06-09 RX ORDER — NALOXONE HYDROCHLORIDE 0.4 MG/ML
80 INJECTION, SOLUTION INTRAMUSCULAR; INTRAVENOUS; SUBCUTANEOUS AS NEEDED
Status: DISCONTINUED | OUTPATIENT
Start: 2023-06-09 | End: 2023-06-09

## 2023-06-09 RX ORDER — HYDROCODONE BITARTRATE AND ACETAMINOPHEN 5; 325 MG/1; MG/1
1 TABLET ORAL ONCE AS NEEDED
Status: DISCONTINUED | OUTPATIENT
Start: 2023-06-09 | End: 2023-06-09

## 2023-06-09 RX ADMIN — ONDANSETRON 4 MG: 2 INJECTION INTRAMUSCULAR; INTRAVENOUS at 14:09:00

## 2023-06-09 RX ADMIN — LIDOCAINE HYDROCHLORIDE 50 MG: 10 INJECTION, SOLUTION EPIDURAL; INFILTRATION; INTRACAUDAL; PERINEURAL at 13:39:00

## 2023-06-09 RX ADMIN — CEFAZOLIN SODIUM/WATER 2 G: 2 G/20 ML SYRINGE (ML) INTRAVENOUS at 13:46:00

## 2023-06-09 RX ADMIN — SODIUM CHLORIDE, SODIUM LACTATE, POTASSIUM CHLORIDE, CALCIUM CHLORIDE: 600; 310; 30; 20 INJECTION, SOLUTION INTRAVENOUS at 13:36:00

## 2023-06-09 RX ADMIN — MIDAZOLAM HYDROCHLORIDE 2 MG: 1 INJECTION INTRAMUSCULAR; INTRAVENOUS at 13:39:00

## 2023-06-09 RX ADMIN — DEXAMETHASONE SODIUM PHOSPHATE 4 MG: 4 MG/ML VIAL (ML) INJECTION at 14:09:00

## 2023-06-09 NOTE — ANESTHESIA PROCEDURE NOTES
Airway  Date/Time: 6/9/2023 1:43 PM  Urgency: elective      General Information and Staff    Patient location during procedure: OR  Anesthesiologist: Ortiz Melgar MD  Performed: anesthesiologist   Performed by: Ortiz Melgar MD  Authorized by:  Ortiz Melgar MD      Indications and Patient Condition  Indications for airway management: anesthesia  Sedation level: deep  Preoxygenated: yes  Patient position: sniffing  Mask difficulty assessment: 2 - vent by mask + OA or adjuvant +/- NMBA (Easy MV with 90OA)    Final Airway Details  Final airway type: endotracheal airway      Successful airway: ETT  Cuffed: yes   Successful intubation technique: direct laryngoscopy  Facilitating devices/methods: intubating stylet  Endotracheal tube insertion site: oral  Blade size: #3  ETT size (mm): 7.0    Cormack-Lehane Classification: grade I - full view of glottis  Placement verified by: capnometry   Measured from: lips  ETT to lips (cm): 22  Number of attempts at approach: 1    Additional Comments  Dentition unchanged from pre op

## 2023-06-09 NOTE — OPERATIVE REPORT
[facility]  Operative Note    Andrés Silver Location: OR   CSN [id number] MRN [id number]    11/3/1960 Age 60year old   Admission Date 2023 Operation Date 2023   Attending Physician Steffi Hickman MD Operating Physician Serina Gardner MD   PCP Gregorio Rankin DO          Patient Name: Andrés Silver    Preoperative Diagnosis: Arm mass, right [R22.31]    Postoperative Diagnosis: Right upper arm mass    Primary Surgeon: Serina Gardner MD    Assistant: None    Anesthesia: General    Procedures: Excision of right arm mass    Implants: None    Specimen: Right arm mass    Drains: None    Estimated Blood Loss: 3 mL    Complications: none    Condition: Stable    Indications for Surgery: Andrés Silver is a 60year old female who presented with right upper arm mass that is soft continuous and easily mobile. This mass has been present for many years without any changes in size. The mass causes the patient discomfort and pain especially with movement. She is here for excision of right upper arm mass. Surgical Findings:   2.5 cm x 1.5 cm upper arm mass consistent with lipoma    Description of Procedure: The patient was transported to the operating room and placed on the operating table in supine position. General endotracheal anesthesia was administered. Preoperative antibiotics were given. The right arm and shoulder were prepped and draped in sterile fashion. A time-out was performed. An incision was made along Mu's lines to the skin and subcutaneous tissue. The lipoma was circumferentially dissected off of the connective tissue and removed in its entirety. This measured 2.5 cm x 1.5 cm. Hemostasis was achieved. Wound was irrigated copiously. Local anesthetic was injected. The subcutaneous deep dermal layer was closed using interrupted 3-0 Vicryl suture. The skin was closed using 4-0 Monocryl. Skin glue was used to seal the incisions.   The patient was awakened from anesthesia and brought to the recovery room in good condition. The patient tolerated the procedure well without apparent complication. All sponge, needle, and instrument counts were correct at the end of the case.     Dana Tripathi MD  6/9/2023  2:20 PM

## 2023-07-08 DIAGNOSIS — R05.3 CHRONIC COUGH: ICD-10-CM

## 2023-07-08 DIAGNOSIS — F17.210 CIGARETTE SMOKER: ICD-10-CM

## 2023-07-10 NOTE — TELEPHONE ENCOUNTER
Pt calling to check status on medication. Pt was informed to please allow 24 to 48 hours to process the refill request.    Pt voiced understanding.

## 2023-07-11 DIAGNOSIS — R05.3 CHRONIC COUGH: ICD-10-CM

## 2023-07-11 DIAGNOSIS — F17.210 CIGARETTE SMOKER: ICD-10-CM

## 2023-07-11 RX ORDER — IPRATROPIUM BROMIDE AND ALBUTEROL 20; 100 UG/1; UG/1
1 SPRAY, METERED RESPIRATORY (INHALATION) EVERY 4 HOURS PRN
Qty: 12 G | Refills: 0 | Status: SHIPPED | OUTPATIENT
Start: 2023-07-11

## 2023-07-12 RX ORDER — IPRATROPIUM BROMIDE AND ALBUTEROL 20; 100 UG/1; UG/1
1 SPRAY, METERED RESPIRATORY (INHALATION) EVERY 4 HOURS PRN
Qty: 12 G | Refills: 0 | OUTPATIENT
Start: 2023-07-12

## 2023-07-15 DIAGNOSIS — I10 ESSENTIAL HYPERTENSION, BENIGN: ICD-10-CM

## 2023-07-18 RX ORDER — HYDROCHLOROTHIAZIDE 25 MG/1
25 TABLET ORAL EVERY MORNING
Qty: 90 TABLET | Refills: 0 | Status: SHIPPED | OUTPATIENT
Start: 2023-07-18

## 2023-08-02 ENCOUNTER — OFFICE VISIT (OUTPATIENT)
Dept: FAMILY MEDICINE CLINIC | Facility: CLINIC | Age: 63
End: 2023-08-02
Payer: MEDICAID

## 2023-08-02 VITALS
WEIGHT: 189 LBS | HEART RATE: 68 BPM | DIASTOLIC BLOOD PRESSURE: 70 MMHG | OXYGEN SATURATION: 97 % | HEIGHT: 62 IN | RESPIRATION RATE: 16 BRPM | BODY MASS INDEX: 34.78 KG/M2 | SYSTOLIC BLOOD PRESSURE: 116 MMHG

## 2023-08-02 DIAGNOSIS — R05.3 CHRONIC COUGH: ICD-10-CM

## 2023-08-02 DIAGNOSIS — E87.6 HYPOKALEMIA: ICD-10-CM

## 2023-08-02 DIAGNOSIS — M79.604 PAIN OF RIGHT LOWER EXTREMITY: ICD-10-CM

## 2023-08-02 DIAGNOSIS — Z23 NEED FOR VACCINATION: ICD-10-CM

## 2023-08-02 DIAGNOSIS — I10 PRIMARY HYPERTENSION: ICD-10-CM

## 2023-08-02 DIAGNOSIS — E66.09 CLASS 1 OBESITY DUE TO EXCESS CALORIES WITH SERIOUS COMORBIDITY AND BODY MASS INDEX (BMI) OF 34.0 TO 34.9 IN ADULT: ICD-10-CM

## 2023-08-02 DIAGNOSIS — Z00.00 ROUTINE GENERAL MEDICAL EXAMINATION AT A HEALTH CARE FACILITY: Primary | ICD-10-CM

## 2023-08-02 DIAGNOSIS — Z78.0 POSTMENOPAUSAL: ICD-10-CM

## 2023-08-02 DIAGNOSIS — T88.7XXA MEDICATION SIDE EFFECT: ICD-10-CM

## 2023-08-02 DIAGNOSIS — D72.820 LYMPHOCYTOSIS: ICD-10-CM

## 2023-08-02 DIAGNOSIS — Z12.31 ENCOUNTER FOR SCREENING MAMMOGRAM FOR MALIGNANT NEOPLASM OF BREAST: ICD-10-CM

## 2023-08-02 DIAGNOSIS — M85.89 OSTEOPENIA OF MULTIPLE SITES: ICD-10-CM

## 2023-08-02 DIAGNOSIS — J41.0 SIMPLE CHRONIC BRONCHITIS (HCC): ICD-10-CM

## 2023-08-02 DIAGNOSIS — F17.210 CIGARETTE SMOKER: ICD-10-CM

## 2023-08-02 PROBLEM — E66.01 CLASS 2 SEVERE OBESITY DUE TO EXCESS CALORIES WITH SERIOUS COMORBIDITY AND BODY MASS INDEX (BMI) OF 36.0 TO 36.9 IN ADULT: Status: RESOLVED | Noted: 2022-07-29 | Resolved: 2023-08-02

## 2023-08-02 PROBLEM — E66.812 CLASS 2 SEVERE OBESITY DUE TO EXCESS CALORIES WITH SERIOUS COMORBIDITY AND BODY MASS INDEX (BMI) OF 36.0 TO 36.9 IN ADULT (HCC): Status: RESOLVED | Noted: 2022-07-29 | Resolved: 2023-08-02

## 2023-08-02 PROBLEM — E66.811 CLASS 1 OBESITY DUE TO EXCESS CALORIES WITH SERIOUS COMORBIDITY AND BODY MASS INDEX (BMI) OF 33.0 TO 33.9 IN ADULT: Status: RESOLVED | Noted: 2021-03-23 | Resolved: 2023-08-02

## 2023-08-02 PROBLEM — E66.01 CLASS 2 SEVERE OBESITY DUE TO EXCESS CALORIES WITH SERIOUS COMORBIDITY AND BODY MASS INDEX (BMI) OF 36.0 TO 36.9 IN ADULT (HCC): Status: RESOLVED | Noted: 2022-07-29 | Resolved: 2023-08-02

## 2023-08-02 PROCEDURE — 90471 IMMUNIZATION ADMIN: CPT | Performed by: FAMILY MEDICINE

## 2023-08-02 PROCEDURE — 3078F DIAST BP <80 MM HG: CPT | Performed by: FAMILY MEDICINE

## 2023-08-02 PROCEDURE — 3074F SYST BP LT 130 MM HG: CPT | Performed by: FAMILY MEDICINE

## 2023-08-02 PROCEDURE — 90715 TDAP VACCINE 7 YRS/> IM: CPT | Performed by: FAMILY MEDICINE

## 2023-08-02 PROCEDURE — 99214 OFFICE O/P EST MOD 30 MIN: CPT | Performed by: FAMILY MEDICINE

## 2023-08-02 PROCEDURE — 99396 PREV VISIT EST AGE 40-64: CPT | Performed by: FAMILY MEDICINE

## 2023-08-02 PROCEDURE — 3008F BODY MASS INDEX DOCD: CPT | Performed by: FAMILY MEDICINE

## 2023-08-02 RX ORDER — HYDROCHLOROTHIAZIDE 25 MG/1
25 TABLET ORAL EVERY MORNING
Qty: 90 TABLET | Refills: 3 | Status: SHIPPED | OUTPATIENT
Start: 2023-08-02

## 2023-08-02 RX ORDER — IPRATROPIUM BROMIDE AND ALBUTEROL 20; 100 UG/1; UG/1
1 SPRAY, METERED RESPIRATORY (INHALATION) EVERY 4 HOURS PRN
Qty: 12 G | Refills: 3 | Status: SHIPPED | OUTPATIENT
Start: 2023-08-02

## 2023-08-04 ENCOUNTER — TELEPHONE (OUTPATIENT)
Dept: FAMILY MEDICINE CLINIC | Facility: CLINIC | Age: 63
End: 2023-08-04

## 2023-08-04 NOTE — TELEPHONE ENCOUNTER
Laine calling to check on the status of her parking placard. Monie Ennis was here on 8/2/23 to see Dr. Charlotte Weinberg and discussed the parking placard. Once parking placard has been completed please mail pt a copy for her records.

## 2023-08-08 PROBLEM — E66.811 CLASS 1 OBESITY DUE TO EXCESS CALORIES WITH SERIOUS COMORBIDITY AND BODY MASS INDEX (BMI) OF 34.0 TO 34.9 IN ADULT: Status: ACTIVE | Noted: 2023-08-08

## 2023-08-08 PROBLEM — J41.0 SIMPLE CHRONIC BRONCHITIS (HCC): Status: ACTIVE | Noted: 2023-08-08

## 2023-08-08 PROBLEM — E66.09 CLASS 1 OBESITY DUE TO EXCESS CALORIES WITH SERIOUS COMORBIDITY AND BODY MASS INDEX (BMI) OF 34.0 TO 34.9 IN ADULT: Status: ACTIVE | Noted: 2023-08-08

## 2023-08-09 ENCOUNTER — TELEPHONE (OUTPATIENT)
Dept: ORTHOPEDICS CLINIC | Facility: CLINIC | Age: 63
End: 2023-08-09

## 2023-08-09 DIAGNOSIS — M79.604 RIGHT LEG PAIN: Primary | ICD-10-CM

## 2023-08-09 NOTE — TELEPHONE ENCOUNTER
- Right leg pain  Anterior shin pain  Ankles feels unstable  Popping below the knee    What x ray do you need?    Tib/fib xr pending  Ankle xr pending

## 2023-08-09 NOTE — TELEPHONE ENCOUNTER
Patient scheduled with Dr. Kyra Orlando for right leg pain (below knee). No recent imaging in epic.     Future Appointments   Date Time Provider [facility] [place]   8/25/2023 11:20 AM Tyree Bosworth, MD [facility] Shruti Wright [id number]

## 2023-08-14 NOTE — TELEPHONE ENCOUNTER
August 11, 2023  Maynard Goodell, MD   to [facility] Orthopedics Clinical Pool       8/11/23  6:00 PM  Lets do Tib / Fib and Ankle XR  August 9, 2023

## 2023-08-25 ENCOUNTER — OFFICE VISIT (OUTPATIENT)
Dept: ORTHOPEDICS CLINIC | Facility: CLINIC | Age: 63
End: 2023-08-25
Payer: MEDICAID

## 2023-08-25 ENCOUNTER — HOSPITAL ENCOUNTER (OUTPATIENT)
Dept: GENERAL RADIOLOGY | Age: 63
Discharge: HOME | End: 2023-08-25
Attending: ORTHOPAEDIC SURGERY
Payer: MEDICAID

## 2023-08-25 VITALS — WEIGHT: 189 LBS | BODY MASS INDEX: 34.78 KG/M2 | HEIGHT: 62 IN

## 2023-08-25 DIAGNOSIS — G89.29 CHRONIC PAIN OF RIGHT KNEE: Primary | ICD-10-CM

## 2023-08-25 DIAGNOSIS — S83.8X1A SPRAIN OF OTHER LIGAMENT OF RIGHT KNEE, INITIAL ENCOUNTER: ICD-10-CM

## 2023-08-25 DIAGNOSIS — M79.604 RIGHT LEG PAIN: ICD-10-CM

## 2023-08-25 DIAGNOSIS — M25.561 CHRONIC PAIN OF RIGHT KNEE: Primary | ICD-10-CM

## 2023-08-25 PROCEDURE — 3008F BODY MASS INDEX DOCD: CPT | Performed by: ORTHOPAEDIC SURGERY

## 2023-08-25 PROCEDURE — 99214 OFFICE O/P EST MOD 30 MIN: CPT | Performed by: ORTHOPAEDIC SURGERY

## 2023-08-25 PROCEDURE — 73590 X-RAY EXAM OF LOWER LEG: CPT | Performed by: ORTHOPAEDIC SURGERY

## 2023-08-25 RX ORDER — METHYLPREDNISOLONE 4 MG/1
TABLET ORAL
Qty: 1 EACH | Refills: 0 | Status: SHIPPED | OUTPATIENT
Start: 2023-08-25

## 2023-08-29 ENCOUNTER — MED REC SCAN ONLY (OUTPATIENT)
Dept: FAMILY MEDICINE CLINIC | Facility: CLINIC | Age: 63
End: 2023-08-29

## 2023-09-06 ENCOUNTER — OFFICE VISIT (OUTPATIENT)
Dept: HEMATOLOGY/ONCOLOGY | Age: 63
End: 2023-09-06
Attending: SPECIALIST
Payer: MEDICAID

## 2023-09-06 VITALS
WEIGHT: 189.38 LBS | DIASTOLIC BLOOD PRESSURE: 89 MMHG | TEMPERATURE: 99 F | RESPIRATION RATE: 18 BRPM | OXYGEN SATURATION: 99 % | HEART RATE: 78 BPM | BODY MASS INDEX: 35 KG/M2 | SYSTOLIC BLOOD PRESSURE: 141 MMHG

## 2023-09-06 DIAGNOSIS — R22.41 KNEE MASS, RIGHT: ICD-10-CM

## 2023-09-06 DIAGNOSIS — D72.829 LEUKOCYTOSIS, UNSPECIFIED TYPE: Primary | ICD-10-CM

## 2023-09-06 DIAGNOSIS — M79.604 RIGHT LEG PAIN: ICD-10-CM

## 2023-09-06 DIAGNOSIS — D72.820 LYMPHOCYTOSIS: ICD-10-CM

## 2023-09-06 LAB
BASOPHILS # BLD AUTO: 0.06 X10(3) UL (ref 0–0.2)
BASOPHILS NFR BLD AUTO: 0.5 %
EOSINOPHIL # BLD AUTO: 0.1 X10(3) UL (ref 0–0.7)
EOSINOPHIL NFR BLD AUTO: 0.8 %
ERYTHROCYTE [DISTWIDTH] IN BLOOD BY AUTOMATED COUNT: 15.2 %
HCT VFR BLD AUTO: 40.7 %
HGB BLD-MCNC: 14 G/DL
IMM GRANULOCYTES # BLD AUTO: 0.05 X10(3) UL (ref 0–1)
IMM GRANULOCYTES NFR BLD: 0.4 %
LYMPHOCYTES # BLD AUTO: 5.02 X10(3) UL (ref 1–4)
LYMPHOCYTES NFR BLD AUTO: 42.6 %
MCH RBC QN AUTO: 30.7 PG (ref 26–34)
MCHC RBC AUTO-ENTMCNC: 34.4 G/DL (ref 31–37)
MCV RBC AUTO: 89.3 FL
MONOCYTES # BLD AUTO: 0.62 X10(3) UL (ref 0.1–1)
MONOCYTES NFR BLD AUTO: 5.3 %
NEUTROPHILS # BLD AUTO: 5.93 X10 (3) UL (ref 1.5–7.7)
NEUTROPHILS # BLD AUTO: 5.93 X10(3) UL (ref 1.5–7.7)
NEUTROPHILS NFR BLD AUTO: 50.4 %
PLATELET # BLD AUTO: 382 10(3)UL (ref 150–450)
RBC # BLD AUTO: 4.56 X10(6)UL
WBC # BLD AUTO: 11.8 X10(3) UL (ref 4–11)

## 2023-09-06 PROCEDURE — 99245 OFF/OP CONSLTJ NEW/EST HI 55: CPT | Performed by: SPECIALIST

## 2023-09-06 NOTE — PROGRESS NOTES
Patient is here today for Hematology Consult  with Duran Bowen - Also complaint of right leg / knee pain rated an 8 on a scale of 0-10. Medication list,medical history and toxicities were reviewed and updated. Education Record    Learner:  Patient     Disease / Diagnosis: Consult    Barriers / Limitations:  None   Comments:    Method:  Brief focused, Discussion, Printed material and Reinforcement   Comments:    General Topics:  Medication, Pain, Procedure and Plan of care reviewed   Comments:    Outcome:  Shows understanding   Comments:    Madeleine Bennett MD visit. Labs today. MRI ordered for Knee and Leg pain. AVS provided and follow up reviewed. Patient instructed to call as needed.

## 2023-09-09 ENCOUNTER — TELEPHONE (OUTPATIENT)
Dept: FAMILY MEDICINE CLINIC | Facility: CLINIC | Age: 63
End: 2023-09-09

## 2023-09-14 LAB
CD10 CELLS NFR SPEC: <1 %
CD10/CD19: <1 %
CD19 CELLS NFR SPEC: 11 %
CD19+/CD200+: 6 %
CD2 CELLS NFR SPEC: 87 %
CD20 CELLS NFR SPEC: 11 %
CD200 CELLS: 6 %
CD3 CELLS NFR SPEC: 81 %
CD3+/TCRGD+: 1 %
CD3+CD4+ CELLS NFR SPEC: 54 %
CD3+CD4+ CELLS/CD3+CD8+ CLL SPEC: 1.6
CD3+CD8+ CELLS NFR SPEC: 33 %
CD3-/CD56+: 7 %
CD34 CELLS NFR SPEC: <1 %
CD38 CELLS NFR SPEC: 1 %
CD38+/CD19+: <1 %
CD45 CELLS NFR SPEC: 100 %
CD5 CELLS NFR SPEC: 82 %
CD5/CD19 CELLS: 1 %
CD7 CELLS NFR SPEC: 81 %
CELL SURF KAPPA/LAMBDA RATIO: 1.5
CELL SURF LAMBDA LIGHT CHAIN: 4 %
CELL SURFACE KAPPA LIGHT CHAIN: 6 %
TCR G-D CELLS NFR SPEC: 1 %

## 2023-09-17 LAB — INTERP BCRABL: NEGATIVE

## 2023-09-18 ENCOUNTER — TELEPHONE (OUTPATIENT)
Dept: HEMATOLOGY/ONCOLOGY | Facility: HOSPITAL | Age: 63
End: 2023-09-18

## 2023-09-22 ENCOUNTER — TELEPHONE (OUTPATIENT)
Dept: FAMILY MEDICINE CLINIC | Facility: CLINIC | Age: 63
End: 2023-09-22

## 2023-09-22 NOTE — TELEPHONE ENCOUNTER
No answer, voice mail on patient's listed number, my chart sent to  patient. The referral was placed by Dr. Tito Haque on 8/25/23 effective until 8/24/24.

## 2023-09-22 NOTE — TELEPHONE ENCOUNTER
Patients MRI was cancelled, insurance stated she needs physical therapy first. Patient would like a referral for PT in the lower level of our building. Please advise.

## 2023-10-05 ENCOUNTER — HOSPITAL ENCOUNTER (OUTPATIENT)
Dept: MAMMOGRAPHY | Age: 63
Discharge: HOME | End: 2023-10-05
Attending: FAMILY MEDICINE
Payer: MEDICAID

## 2023-10-05 DIAGNOSIS — Z12.31 ENCOUNTER FOR SCREENING MAMMOGRAM FOR MALIGNANT NEOPLASM OF BREAST: ICD-10-CM

## 2023-10-05 PROCEDURE — 77063 BREAST TOMOSYNTHESIS BI: CPT | Performed by: FAMILY MEDICINE

## 2023-10-05 PROCEDURE — 77067 SCR MAMMO BI INCL CAD: CPT | Performed by: FAMILY MEDICINE

## 2023-10-11 ENCOUNTER — OFFICE VISIT (OUTPATIENT)
Dept: PHYSICAL THERAPY | Age: 63
End: 2023-10-11
Attending: ORTHOPAEDIC SURGERY
Payer: MEDICAID

## 2023-10-11 DIAGNOSIS — M25.561 CHRONIC PAIN OF RIGHT KNEE: Primary | ICD-10-CM

## 2023-10-11 DIAGNOSIS — S83.8X1D SPRAIN OF OTHER LIGAMENT OF RIGHT KNEE, SUBSEQUENT ENCOUNTER: ICD-10-CM

## 2023-10-11 DIAGNOSIS — G89.29 CHRONIC PAIN OF RIGHT KNEE: Primary | ICD-10-CM

## 2023-10-11 DIAGNOSIS — R26.2 DIFFICULTY WALKING: ICD-10-CM

## 2023-10-11 PROCEDURE — 97161 PT EVAL LOW COMPLEX 20 MIN: CPT

## 2023-10-11 PROCEDURE — 97110 THERAPEUTIC EXERCISES: CPT

## 2023-10-13 ENCOUNTER — OFFICE VISIT (OUTPATIENT)
Dept: PHYSICAL THERAPY | Age: 63
End: 2023-10-13
Attending: ORTHOPAEDIC SURGERY
Payer: MEDICAID

## 2023-10-13 PROCEDURE — 97140 MANUAL THERAPY 1/> REGIONS: CPT

## 2023-10-13 PROCEDURE — 97110 THERAPEUTIC EXERCISES: CPT

## 2023-10-13 NOTE — PROGRESS NOTES
Diagnosis:   Chronic pain of right knee (M25.561,G89.29),  Sprain of other ligament of right knee, subsequent encounter (K10.8A0K), difficulty walking (R26.2) Referring Provider: Amanda Fairbanks MD Date of Evaluation:    10/11/2023    Precautions:  None Next MD visit:   none scheduled  Date of Surgery: n/a  Symptom onset: ~6 mo ago, no ZORA   Insurance Primary/Secondary: BLUE CROSS MEDICAID / N/A     # Auth Visits: 8            Subjective: Patient states she has been compliant with HEP and states these have been helping to reduce pain. Patient states standing up is difficult due to pain and typically relies on right side to initiate, but since onset of symptoms has been relying on left, which is harder. Pain: 6/10    Objective: R AROM Knee Flexion: 105 deg       Assessment: Significant tension of bilateral gastroc noted with limited tolerance to slant board stretch, modified to performing in standing supported with minimal lunge. Patient demonstrates significant increase in knee flexion range compared to IE on this date from 84 degrees to 105 following bridges for CKC strengthening. Added unsupported STS as well for functional strengthening and knee flexion ROM. Goals:   (to be met in 8 visits)  Consistently decr pain < or = 3/10 intermittent for incr Qol and activity tolerance  Overall incr function as indicated by incr LEFS at least 10 pts  Incr R knee ROM to 0-115 or more for incr quality of gait and ability to squat  Pt able to ascend/descend stairs reciprocally c little to no pain for incr functional mobility  Pt able to ambulate c minimal to no gait deviations, for at least 10 min bouts for incr community mobility  Incr RLE MMT to at least 4/5 painfree for incr mm support for WB activities  Indep HEP to promote cont progress toward functional goals    Plan: Progress quad strengthening, knee flex/ext ROM as tolerated.  Trial SLS next visit   Date: 10/13/2023  TX#: 2/8 Date:                 TX#: 3/ Date: TX#: 4/ Date:                 TX#: 5/ Date: Tx#: 6/   Ther Ex: 45'  Nustep 6'   Calf Stretch w UE support 3x30\" (did not tolerate slant board)  Supine QS   Bridges 2x5 (initiated with staggered for incr WB L less R, second set equal WB)  Supine QS w SLR 2x10   H/L Hip Add Iso w Ball x15 5\" H  H/L Hip Abduction w GTB x15 ea R/L   Supine Nicholas Stretch R 2x20\"   Seated HS Stretch 3x20\" RLE   STS x10        Manual: 10'  STM: R Gastroc, Surrounding Patella  PFJ: M/L glides, attempted superior                     IE HEP: Access Code: [email or web address] ; URL: [email or web address]/  Date: 10/11/2023 ; Prepared by: Sandra Polo  - Supine Quad Set  - 2 x daily - 10 reps - 5 hold  - Straight Leg Raise  - 2 x daily - 1 sets - 10 reps  - Supine Hip Adduction Isometric with Ball  - 2 x daily - 10 reps - 5 hold  - Supine Heel Slides  - 2 x daily - 2 sets - 10 reps  - Seated Hamstring Stretch  - 2 x daily - 3 reps - 15 hold  - Standing Gastroc Stretch at Counter  - 2 x daily - 3 reps - 15 hold  - Standing Hip Abduction with Counter Support  - 2 x daily - 1 sets - 10 reps    Charges: Manual x1, Ther Ex x 3       Total Timed Treatment: 48 min  Total Treatment Time: 48 min

## 2023-10-18 ENCOUNTER — OFFICE VISIT (OUTPATIENT)
Dept: PHYSICAL THERAPY | Age: 63
End: 2023-10-18
Attending: ORTHOPAEDIC SURGERY
Payer: MEDICAID

## 2023-10-18 PROCEDURE — 97140 MANUAL THERAPY 1/> REGIONS: CPT

## 2023-10-18 PROCEDURE — 97110 THERAPEUTIC EXERCISES: CPT

## 2023-10-18 NOTE — PROGRESS NOTES
Diagnosis:   Chronic pain of right knee (M25.561,G89.29),  Sprain of other ligament of right knee, subsequent encounter (C78.6J7C), difficulty walking (R26.2) Referring Provider: Racquel Hyde MD Date of Evaluation:    10/11/2023    Precautions:  None Next MD visit:   none scheduled  Date of Surgery: n/a  Symptom onset: ~6 mo ago, no ZORA   Insurance Primary/Secondary: BLUE CROSS MEDICAID / N/A     # Auth Visits: 8            Subjective: Patient states her knee has been feeling a lot better than last session, but with sitting for a while she states since she got to PT early, stiffness has made it more painful. Patient reports increased muscle soreness since previous session and ending today   Pain: 7/10     Objective: R AROM Knee Flexion: 105 deg       Assessment: Patient demonstrated good carry over of knee flexion tolerance in WB with increased repetitions and added sustained hold, but with knee extension still limited. Addressed with cupping surrounding patella and adding self mobilization with ball to HS and calf, with improved mobility of both, but reports of increased muscle soreness despite this.  Plan to update HEP next visit     Goals:   (to be met in 8 visits)  Consistently decr pain < or = 3/10 intermittent for incr Qol and activity tolerance  Overall incr function as indicated by incr LEFS at least 10 pts  Incr R knee ROM to 0-115 or more for incr quality of gait and ability to squat  Pt able to ascend/descend stairs reciprocally c little to no pain for incr functional mobility  Pt able to ambulate c minimal to no gait deviations, for at least 10 min bouts for incr community mobility  Incr RLE MMT to at least 4/5 painfree for incr mm support for WB activities  Indep HEP to promote cont progress toward functional goals    Plan: Update HEP next visit     Updated HEP: Supine QS, STS x10 every hour for 1 week, Supine QS w SLR, S/L Clamshells, Standing Calf Stretch at Counter, HS Stretch seated, [employer] Date: 10/13/2023  TX#: 2/8 Date: 10/18/2023   TX#: 3/8 Date:                 TX#: 4/ Date:                 TX#: 5/ Date: Tx#: 6/   Ther Ex: 45'  Nustep 6'   Calf Stretch w UE support 3x30\" (did not tolerate slant board)  Supine QS   Bridges 2x5 (initiated with staggered for incr WB L less R, second set equal WB)  Supine QS w SLR 2x10   H/L Hip Add Iso w Ball x15 5\" H  H/L Hip Abduction w GTB x15 ea R/L   Supine Nicholas Stretch R 2x20\"   Seated HS Stretch 3x20\" RLE   STS x10  Ther Ex: 38'  Nustep 5'   Lunging Calf Stretch 3x30\"   Supine QS x20 5\" H  Bridges 2x10 3\" H  Supine QS w SLR 2x10   H/L Hip Add Iso w Ball x20 5\" H  H/L Hip Abduction w GTB 2x15 ea R/L   S/L Clamshells x10 3\" H ea R/L   Seated HS Stretch 3x20\" RLE   Seated HS Mobilization with Ball x20   STS x10   LS Calf Mobilization with Janece Edgard x20       Manual: 15'  STM: R Gastroc, Surrounding Patella  PFJ: M/L glides, attempted superior Manual: 15'  STM: R Gastroc, Surrounding Patella  PFJ: M/L glides, attempted superior                    IE HEP: Access Code: [email or web address] ; URL: [email or web address]/  Date: 10/11/2023 ; Prepared by: Lee mSith  - Supine Quad Set  - 2 x daily - 10 reps - 5 hold  - Straight Leg Raise  - 2 x daily - 1 sets - 10 reps  - Supine Hip Adduction Isometric with Ball  - 2 x daily - 10 reps - 5 hold  - Supine Heel Slides  - 2 x daily - 2 sets - 10 reps  - Seated Hamstring Stretch  - 2 x daily - 3 reps - 15 hold  - Standing Gastroc Stretch at Counter  - 2 x daily - 3 reps - 15 hold  - Standing Hip Abduction with Counter Support  - 2 x daily - 1 sets - 10 reps    10/18- Added STS x10 1x/daily     Charges: Manual x1, Ther Ex x 3       Total Timed Treatment: 48 min  Total Treatment Time: 48 min

## 2023-10-20 ENCOUNTER — APPOINTMENT (OUTPATIENT)
Dept: PHYSICAL THERAPY | Age: 63
End: 2023-10-20
Attending: ORTHOPAEDIC SURGERY
Payer: MEDICAID

## 2023-10-23 ENCOUNTER — TELEPHONE (OUTPATIENT)
Dept: PHYSICAL THERAPY | Facility: HOSPITAL | Age: 63
End: 2023-10-23

## 2023-10-23 ENCOUNTER — APPOINTMENT (OUTPATIENT)
Dept: PHYSICAL THERAPY | Age: 63
End: 2023-10-23
Attending: ORTHOPAEDIC SURGERY
Payer: MEDICAID

## 2023-10-25 ENCOUNTER — OFFICE VISIT (OUTPATIENT)
Dept: PHYSICAL THERAPY | Age: 63
End: 2023-10-25
Attending: ORTHOPAEDIC SURGERY
Payer: MEDICAID

## 2023-10-25 PROCEDURE — 97140 MANUAL THERAPY 1/> REGIONS: CPT

## 2023-10-25 PROCEDURE — 97110 THERAPEUTIC EXERCISES: CPT

## 2023-10-25 NOTE — PROGRESS NOTES
Diagnosis:   Chronic pain of right knee (M25.561,G89.29),  Sprain of other ligament of right knee, subsequent encounter (M89.1A6M), difficulty walking (R26.2) Referring Provider: Zoya Kinney MD Date of Evaluation:    10/11/2023    Precautions:  None Next MD visit:   none scheduled  Date of Surgery: n/a  Symptom onset: ~6 mo ago, no ZORA   Insurance Primary/Secondary: BLUE CROSS MEDICAID / N/A     # Auth Visits: 8            Subjective: Patient states she is hurting a lot more today due to the weather. Patient states the pain woke her up from a deep sleep because of the rain, but also admits she was busy on her feet yesterday and then came home and did all the strengthening exercises for her home program, but not all the stretching. Pain: 10/10 but denies need to go to ER (beginning session)    Objective:      Assessment: Treatment on this day focused on pain management initially with gentle soft tissue and PROM for right knee. Increased time required with this to achieve relief. Moderate adhesions noted at medial knee near adductors and VMO. Patient then tolerated resuming hip and knee strengthening and continued self mobilization exercises. Added bent over hip extension on this day for progressed gluteal strengthening and begin intro to SLS on affected LE.  Improved symptoms reported ending visit, but with stiffness still present    Goals:   (to be met in 8 visits)  Consistently decr pain < or = 3/10 intermittent for incr Qol and activity tolerance  Overall incr function as indicated by incr LEFS at least 10 pts  Incr R knee ROM to 0-115 or more for incr quality of gait and ability to squat  Pt able to ascend/descend stairs reciprocally c little to no pain for incr functional mobility  Pt able to ambulate c minimal to no gait deviations, for at least 10 min bouts for incr community mobility  Incr RLE MMT to at least 4/5 painfree for incr mm support for WB activities  Indep HEP to promote cont progress toward functional goals    Plan: Update HEP next visit     Updated HEP: Supine QS, STS x10 every hour for 1 week, Supine QS w SLR, S/L Clamshells, Standing Calf Stretch at Counter, HS Stretch seated, Bridges   Date: 10/13/2023  TX#: 2/8 Date: 10/18/2023   TX#: 3/8 Date: 10/25/2023            TX#: 4/8 Date:                TX#: 5/ Date: Tx#: 6/   Ther Ex: 45'  Nustep 6'   Calf Stretch w UE support 3x30\" (did not tolerate slant board)  Supine QS   Bridges 2x5 (initiated with staggered for incr WB L less R, second set equal WB)  Supine QS w SLR 2x10   H/L Hip Add Iso w Ball x15 5\" H  H/L Hip Abduction w GTB x15 ea R/L   Supine Nicholas Stretch R 2x20\"   Seated HS Stretch 3x20\" RLE   STS x10  Ther Ex: 38'  Nustep 5'   Lunging Calf Stretch 3x30\"   Supine QS x20 5\" H  Bridges 2x10 3\" H  Supine QS w SLR 2x10   H/L Hip Add Iso w Ball x20 5\" H  H/L Hip Abduction w GTB 2x15 ea R/L   S/L Clamshells x10 3\" H ea R/L   Seated HS Stretch 3x20\" RLE   Seated HS Mobilization with Ball x20   STS x10   LS Calf Mobilization with Doc Flock x20  Ther Ex: 38'  Nustep 6'   Lunging Calf Stretch 3x30\"   Supine QS x20 5\" H  Bridges 2x10 3\" H  S/L Clamshells x10 3\" H ea R/L   Seated HS Stretch 3x20\" RLE   Seated HS Mobilization with Ball x20   LS Calf Mobilization with Ball x20 ea R/L   Bent Over Hip Ext x10 ea R/L  UPDATE HEP (w exercises listed above, patient out of town until 11/07 after 10/31)    Manual: 15'  STM: R Gastroc, Surrounding Patella  PFJ: M/L glides, attempted superior Manual: 15'  STM: R Gastroc, Surrounding Patella  PFJ: M/L glides, attempted superior Manual: 21'  STM: Quads, VMO  PFJ: M/L glides, superior glides tolerated better this tx                   IE HEP: Access Code: [email or web address] ; URL: [email or web address]/  Date: 10/11/2023 ; Prepared by: Enrico Gutierrez  - Supine Quad Set  - 2 x daily - 10 reps - 5 hold  - Straight Leg Raise  - 2 x daily - 1 sets - 10 reps  - Supine Hip Adduction Isometric with Ball  - 2 x daily - 10 reps - 5 hold  - Supine Heel Slides  - 2 x daily - 2 sets - 10 reps  - Seated Hamstring Stretch  - 2 x daily - 3 reps - 15 hold  - Standing Gastroc Stretch at Counter  - 2 x daily - 3 reps - 15 hold  - Standing Hip Abduction with Counter Support  - 2 x daily - 1 sets - 10 reps    10/18- Added STS x10 1x/daily     Charges: Manual x 2, Ther Ex x 3       Total Timed Treatment: 61min  Total Treatment Time: 61 min - - -

## 2023-10-31 ENCOUNTER — OFFICE VISIT (OUTPATIENT)
Dept: PHYSICAL THERAPY | Age: 63
End: 2023-10-31
Attending: ORTHOPAEDIC SURGERY
Payer: MEDICAID

## 2023-10-31 PROCEDURE — 97110 THERAPEUTIC EXERCISES: CPT

## 2023-10-31 NOTE — PROGRESS NOTES
Diagnosis:   Chronic pain of right knee (M25.561,G89.29),  Sprain of other ligament of right knee, subsequent encounter (Z35.6L1O), difficulty walking (R26.2) Referring Provider: Hazel Hagen MD Date of Evaluation:    10/11/2023    Precautions:  None Next MD visit:   none scheduled  Date of Surgery: n/a  Symptom onset: ~6 mo ago, no ZORA   Insurance Primary/Secondary: BLUE CROSS MEDICAID / N/A     # Auth Visits: 8            Subjective: Patient states she has been doing exercises all last week and noticed she is walking better, has less stiffness, and pain has been overall better  Pain: 4/10    Objective: L HS spasm with bridges    Assessment: Patient presented with significant improvement in symptoms, progressed to single leg balance, tandem balance, and continued hip strengthening exercises. Reviewed updated HEP with patient receptive as patient will be out of town until 11/07 and will continue new HEP during this time. Goals:   (to be met in 8 visits)  Consistently decr pain < or = 3/10 intermittent for incr Qol and activity tolerance  Overall incr function as indicated by incr LEFS at least 10 pts  Incr R knee ROM to 0-115 or more for incr quality of gait and ability to squat  Pt able to ascend/descend stairs reciprocally c little to no pain for incr functional mobility  Pt able to ambulate c minimal to no gait deviations, for at least 10 min bouts for incr community mobility  Incr RLE MMT to at least 4/5 painfree for incr mm support for WB activities  Indep HEP to promote cont progress toward functional goals    Plan: Plan to reassess at visit 6 and add TKE ball on wall next visit   Date: 10/13/2023  TX#: 2/8 Date: 10/18/2023   TX#: 3/8 Date: 10/25/2023            TX#: 4/8 Date: 10/31/2023      TX#: 5/ Date:    Tx#: 6/   Ther Ex: 45'  Nustep 6'   Calf Stretch w UE support 3x30\" (did not tolerate slant board)  Supine QS   Bridges 2x5 (initiated with staggered for incr WB L less R, second set equal WB)  Supine QS w SLR 2x10   H/L Hip Add Iso w Ball x15 5\" H  H/L Hip Abduction w GTB x15 ea R/L   Supine Nicholas Stretch R 2x20\"   Seated HS Stretch 3x20\" RLE   STS x10  Ther Ex: 38'  Nustep 5'   Lunging Calf Stretch 3x30\"   Supine QS x20 5\" H  Bridges 2x10 3\" H  Supine QS w SLR 2x10   H/L Hip Add Iso w Ball x20 5\" H  H/L Hip Abduction w GTB 2x15 ea R/L   S/L Clamshells x10 3\" H ea R/L   Seated HS Stretch 3x20\" RLE   Seated HS Mobilization with Ball x20   STS x10   LS Calf Mobilization with Soha Prow x20  Ther Ex: 38'  Nustep 6'   Lunging Calf Stretch 3x30\"   SLS 2x30\" Ea R/L  Tandem Stance 1' Ea R/L   Seated HS Stretch 3x20\" RLE   Seated HS Mobilization with Ball x20   Bridges x10 3\" H  Supine QS w SLR x10 ea R/L  S/L Hip Abd x10 3\" H ea R/L    LS Calf Mobilization with Ball x20 ea R/L   Bent Over Hip Ext x10 ea R/L      Manual: 15'  STM: R Gastroc, Surrounding Patella  PFJ: M/L glides, attempted superior Manual: 15'  STM: R Gastroc, Surrounding Patella  PFJ: M/L glides, attempted superior Manual: 21'  STM: Quads, VMO  PFJ: M/L glides, superior glides tolerated better this tx                   IE HEP: Access Code: [email or web address] ; URL: [email or web address]/  Date: 10/11/2023 ; Prepared by: Lauren Rubinstein  - Supine Quad Set  - 2 x daily - 10 reps - 5 hold  - Straight Leg Raise  - 2 x daily - 1 sets - 10 reps  - Supine Hip Adduction Isometric with Ball  - 2 x daily - 10 reps - 5 hold  - Supine Heel Slides  - 2 x daily - 2 sets - 10 reps  - Seated Hamstring Stretch  - 2 x daily - 3 reps - 15 hold  - Standing Gastroc Stretch at Counter  - 2 x daily - 3 reps - 15 hold  - Standing Hip Abduction with Counter Support  - 2 x daily - 1 sets - 10 reps    10/18- Added STS x10 1x/daily     Charges:  Ther Ex x 3       Total Timed Treatment: 45 min  Total Treatment Time: 45 min

## 2023-11-07 ENCOUNTER — APPOINTMENT (OUTPATIENT)
Dept: PHYSICAL THERAPY | Age: 63
End: 2023-11-07
Attending: ORTHOPAEDIC SURGERY
Payer: MEDICAID

## 2024-03-15 ENCOUNTER — OFFICE VISIT (OUTPATIENT)
Dept: FAMILY MEDICINE CLINIC | Facility: CLINIC | Age: 64
End: 2024-03-15
Payer: MEDICAID

## 2024-03-15 VITALS
TEMPERATURE: 97 F | OXYGEN SATURATION: 96 % | DIASTOLIC BLOOD PRESSURE: 82 MMHG | WEIGHT: 192 LBS | HEART RATE: 79 BPM | RESPIRATION RATE: 18 BRPM | BODY MASS INDEX: 35 KG/M2 | SYSTOLIC BLOOD PRESSURE: 134 MMHG

## 2024-03-15 DIAGNOSIS — M25.361 KNEE INSTABILITY, RIGHT: ICD-10-CM

## 2024-03-15 DIAGNOSIS — Z71.6 ENCOUNTER FOR SMOKING CESSATION COUNSELING: ICD-10-CM

## 2024-03-15 DIAGNOSIS — J41.0 SIMPLE CHRONIC BRONCHITIS (HCC): Primary | ICD-10-CM

## 2024-03-15 DIAGNOSIS — Z76.89 ENCOUNTER FOR NEW MEDICATION PRESCRIPTION: ICD-10-CM

## 2024-03-15 DIAGNOSIS — R26.2 DIFFICULTY WALKING: ICD-10-CM

## 2024-03-15 DIAGNOSIS — G89.29 CHRONIC PAIN OF RIGHT KNEE: ICD-10-CM

## 2024-03-15 DIAGNOSIS — F17.210 CIGARETTE SMOKER: ICD-10-CM

## 2024-03-15 DIAGNOSIS — M25.561 CHRONIC PAIN OF RIGHT KNEE: ICD-10-CM

## 2024-03-15 DIAGNOSIS — M25.511 RIGHT SHOULDER PAIN, UNSPECIFIED CHRONICITY: ICD-10-CM

## 2024-03-15 RX ORDER — VARENICLINE TARTRATE 0.5 MG/1
TABLET, FILM COATED ORAL
Qty: 11 TABLET | Refills: 0 | Status: SHIPPED | OUTPATIENT
Start: 2024-03-15

## 2024-03-15 RX ORDER — VARENICLINE TARTRATE 1 MG/1
1 TABLET, FILM COATED ORAL 2 TIMES DAILY
Qty: 60 TABLET | Refills: 1 | Status: SHIPPED | OUTPATIENT
Start: 2024-03-15

## 2024-03-15 NOTE — PATIENT INSTRUCTIONS
-Try to stop smoking or cut down on smoking on the eighth day of taking the Chantix.    -Start taking the generic Chantix as follows:  Start 0.5 mg p.o. daily x 3 days, then increase to twice a day x 4 days.    Then start the 1 mg twice a day.  If you have intolerable side effects (such as bad dreams) when taking the 1 mg dose try taking it just in the morning

## 2024-03-15 NOTE — PROGRESS NOTES
Laine Godinez is a 63 year old female.     Chief Complaint   Patient presents with    Osteoarthritis    Other     Renew placard R leg continuous care     Knee Pain     R pain comes and goes    Smoking         HPI:       Cigarette smoking:  Started smoking age 15 years old.  Twice she quit smoking, once for 2 years, another time for 1 year.  Smokes 5 cigarettes a day, patient denies ever having smoked a half a pack a day or 1 pack a day.  Patient is interested in smoking cessation.      Simple chronic bronchitis:  Patient uses Combivent, tolerating well, no adverse effects noticed.      Chronic pain of right knee/Knee instability, right/Difficulty walking:  Patient uses a cane to assist with ambulation.  Patient requesting parking placard      Complains of shoulder pain after surgery.          Wt Readings from Last 6 Encounters:   03/15/24 192 lb (87.1 kg)   09/06/23 189 lb 6.4 oz (85.9 kg)   08/25/23 189 lb (85.7 kg)   08/02/23 189 lb (85.7 kg)   06/09/23 189 lb (85.7 kg)   05/18/23 184 lb 6.4 oz (83.6 kg)      Body mass index is 35.12 kg/m².        Current Outpatient Medications   Medication Sig Dispense Refill    varenicline 0.5 MG Oral Tab Start 0.5 mg p.o. daily x 3 days, then increase to twice a day x 4 days.  Then start the 1 mg twice a day. 11 tablet 0    varenicline 1 MG Oral Tab Take 1 tablet (1 mg total) by mouth 2 (two) times daily. Start after completing 7 days of the 0.5 mg dose 60 tablet 1    hydroCHLOROthiazide 25 MG Oral Tab Take 1 tablet (25 mg total) by mouth every morning. 90 tablet 3    cholecalciferol (VITAMIN D3) 125 MCG (5000 UT) Oral Cap Take 1 capsule (5,000 Units total) by mouth daily.      ipratropium-albuterol (COMBIVENT RESPIMAT)  MCG/ACT Inhalation Aero Soln Inhale 1 puff into the lungs every 4 (four) hours as needed. 12 g 3      Past Medical History:    Acute mastitis of left breast    Arthritis    Very painful most days.    Cervicalgia    Class 2 severe obesity due to excess  calories with serious comorbidity and body mass index (BMI) of 36.0 to 36.9 in adult (HCC)    Associated with hypertension and hypercholesterolemia    Current smoker    Diffusion capacity of lung (dl), decreased    severe     Essential hypertension    Fibroadenosis of breast    Hematochezia    High blood pressure    History of fusion of cervical spine    History of hernia repair    History of umbilical hernia repair    Hyperlipidemia    Hypertension goal BP (blood pressure) < 130/80    Lung nodule    Medication side effect    possibly from Celebrex, see above     Osteoarthritis of right hip    Osteoarthrosis, unspecified whether generalized or localized, unspecified site    Osteopenia    Paresthesia and pain of both upper extremities    Problems with swallowing    Rectal bleeding    Renal cyst    Renal cyst, right    Upper to midpole 6.7cm on CT done at [facility] 2017     Right-sided chest wall pain    Likely Zoster    Shoulder pain, left    Shoulder pain, right    Solitary cyst of breast      Past Surgical History:   Procedure Laterality Date    Colonoscopy      Colonoscopy  2017    Hernia surgery  2013    Hip replacement surgery      Right hip total replacement    Hysterectomy      1985    Bud localization wire 1 site right (cpt=19281)      loc, , benign    Needle biopsy right            1974    Oophorectomy      1985    Other surgical history  2010    Neck hernated disc    Other surgical history Right 2023    Soft Tissue removal on the right arm    Spine surgery procedure unlisted  2011    \"neck surgery,\" [facility], [place], [place]    Tonsillectomy  1964    Total abdom hysterectomy  1985    Total hip replacement Left 2016    Dr. Greene at [facility]    Upper gi endoscopy performed  2017    Upper gi endoscopy,exam  2019    Dr. Jordon Arreola at [facility]       Social History:    Social History      Socioeconomic History    Marital status: Single   Tobacco Use    Smoking status: Every Day     Current packs/day: 0.25     Average packs/day: 0.3 packs/day for 40.0 years (10.0 ttl pk-yrs)     Types: Cigarettes    Smokeless tobacco: Never    Tobacco comments:     2 or 3 cigarettes per day   Vaping Use    Vaping status: Never Used   Substance and Sexual Activity    Alcohol use: Yes     Comment: occasional Holidays    Drug use: Yes     Types: Cannabis     Comment: prn for pain - at night when she cannot sleep   Other Topics Concern    Blood Transfusions No    Caffeine Concern No    Sleep Concern No    Stress Concern No    Weight Concern Yes     Comment: Why weight go up an down,lose weight  gain it [facility] ....    Special Diet No    Exercise No    Seat Belt Yes         Family History   Problem Relation Age of Onset    Heart Attack Father     Heart Disorder Father     Pulmonary Disease Mother         COPD    Asthma Mother     Diabetes Mother     Heart Disorder Mother     Hypertension Mother     Cancer Brother     Diabetes Son     Hypertension Son     Diabetes Sister     Hypertension Brother     Obesity Brother      REVIEW OF SYSTEMS:   GENERAL HEALTH: Overall feels well.    SKIN: denies any unusual skin lesions or rashes   RESPIRATORY: Chronic cough is at baseline  CARDIOVASCULAR: Denies CP/palpitations  VASCULAR: Denies LE edema, denies claudication type symptoms  GI: Denies abdominal pain/nausea/vomiting/blood in stool/black stool/bloating/constipation/diarrhea  : denies urinary symptoms  NEURO: denies headaches/dizziness  PSYCH: denies depression and anxiety    Immunization History   Administered Date(s) Administered    Covid-19 Vaccine Pfizer 30 mcg/0.3 ml 10/08/2021, 10/29/2021    Covid-19 Vaccine Pfizer Saurav-Sucrose 30 mcg/0.3 ml 03/30/2022    TDAP 08/02/2023       EXAM:   /82   Pulse 79   Temp 97.2 °F (36.2 °C) (Temporal)   Resp 18   Wt 192 lb (87.1 kg)   LMP  (LMP Unknown)   SpO2 96%   BMI  35.12 kg/m²   GENERAL: NAD, pleasant not acutely ill-appearing -American female  SKIN: no visible rashes  HEAD: NCAT  LUNGS: CTA A/P no wheezes/ronchi/rales/crackles  CARDIO: RRR, +S1/S2, no mm  VASCULAR: No lower extremity pitting edema  EXTREMITIES: no cyanosis or clubbing  NEURO: Alert and Oriented x3.    PSYCH: Affect normal.  Normal thought content.        DATA:    Complete PFT  Order: [id number]  Resulted 8/15/2019 11:00 AM       Status: Final result       Visible to patient: Yes (seen)       Next appt: None       Dx: Diffusion capacity of lung (dl), decr...    0 Result Notes       1 Patient Communication        Procedure Note    Findings:  FEV1 is 1.50L, 74% predicted.  FVC is 1.94L, 77% predicted.  FEV1/ FVC ratio is 0.77.  The flow-volume loop demonstrates a mixed pattern of obstruction and restriction.  The TLC is 4.81L, 105% predicted.  The residual volume 2.83L, 157% predicted.  The diffusion capacity is 11.54, 61% predicted and 3.13, 73% predicted when corrected for alveolar volume.     Impression:  There is no evidence airway obstruction or restrictive lung disease.   Despite the absence of airway obstruction, the residual volume is elevated consistent with air trapping.   Diffusion capacity is mildly reduced with DLCO/VA of 73% and given the lack of airway obstruction or restrictive lung disease, this could be suggestive of early interstitial lung disease, pulmonary vascular disease (pulmonary emboli, pulmonary hypertension) or even anemia.   There are no previous pulmonary function tests available for comparison.   Abdirahman Gusman MD          Last Resulted: 08/15/19 11:00 AM               ASSESSMENT AND PLAN:       Encounter Diagnoses   Name Primary?    Simple chronic bronchitis (HCC) Yes    Encounter for smoking cessation counseling     Cigarette smoker     Encounter for new medication prescription     Chronic pain of right knee     Knee instability, right     Difficulty walking     Right  shoulder pain, unspecified chronicity        1. Simple chronic bronchitis (HCC)  Patient to continue Combivent as needed.  Follow-up on chronic bronchitis in 6 months.    2. Encounter for smoking cessation counseling  3. Cigarette smoker  Patient was advised to quit smoking was offered assistance.  Trial of varenicline as below.  Follow-up in 6 months for progress on smoking cessation.    - varenicline 0.5 MG Oral Tab; Start 0.5 mg p.o. daily x 3 days, then increase to twice a day x 4 days.  Then start the 1 mg twice a day.  Dispense: 11 tablet; Refill: 0  - varenicline 1 MG Oral Tab; Take 1 tablet (1 mg total) by mouth 2 (two) times daily. Start after completing 7 days of the 0.5 mg dose  Dispense: 60 tablet; Refill: 1    4. Encounter for new medication prescription  Medication use, risks, benefits, side effects and precautions discussed, patient verbalizes understanding. Questions encouraged and answered to patient's satisfaction.    - varenicline 0.5 MG Oral Tab; Start 0.5 mg p.o. daily x 3 days, then increase to twice a day x 4 days.  Then start the 1 mg twice a day.  Dispense: 11 tablet; Refill: 0  - varenicline 1 MG Oral Tab; Take 1 tablet (1 mg total) by mouth 2 (two) times daily. Start after completing 7 days of the 0.5 mg dose  Dispense: 60 tablet; Refill: 1    5. Chronic pain of right knee  6. Knee instability, right  7. Difficulty walking  Patient will continue to use cane to assist with ambulation.  Parking placard form completed and signed.    8. Right shoulder pain, unspecified chronicity  Follow-up with orthopedic surgeon as needed.          Meds & Refills for this Visit:  Requested Prescriptions     Signed Prescriptions Disp Refills    varenicline 0.5 MG Oral Tab 11 tablet 0     Sig: Start 0.5 mg p.o. daily x 3 days, then increase to twice a day x 4 days.  Then start the 1 mg twice a day.    varenicline 1 MG Oral Tab 60 tablet 1     Sig: Take 1 tablet (1 mg total) by mouth 2 (two) times daily. Start  after completing 7 days of the 0.5 mg dose       Return in about 6 weeks (around 4/26/2024) for Progress on smoking cessation.

## 2024-03-28 ENCOUNTER — TELEPHONE (OUTPATIENT)
Dept: FAMILY MEDICINE CLINIC | Facility: CLINIC | Age: 64
End: 2024-03-28

## 2024-03-28 DIAGNOSIS — F17.210 CIGARETTE SMOKER: ICD-10-CM

## 2024-03-28 DIAGNOSIS — R05.3 CHRONIC COUGH: ICD-10-CM

## 2024-03-28 RX ORDER — IPRATROPIUM BROMIDE AND ALBUTEROL 20; 100 UG/1; UG/1
1 SPRAY, METERED RESPIRATORY (INHALATION) EVERY 4 HOURS PRN
Qty: 12 G | Refills: 3 | Status: SHIPPED | OUTPATIENT
Start: 2024-03-28

## 2024-03-28 NOTE — TELEPHONE ENCOUNTER
Requested Prescriptions     Signed Prescriptions Disp Refills    ipratropium-albuterol (COMBIVENT RESPIMAT)  MCG/ACT Inhalation Aero Soln 12 g 3     Sig: Inhale 1 puff into the lungs every 4 (four) hours as needed.     Authorizing Provider: YARI CLAY     Ordering User: SHARYN GONZALES     Refilled per protocol/OV notes

## 2024-03-28 NOTE — TELEPHONE ENCOUNTER
Laine Godinez requesting medication refill for ipratropium-albuterol (COMBIVENT RESPIMAT)  .    LOV: 3/15/2024   Last Refill Date:   30 or 90 Day Supply:  Pharmacy Location: [facility] on file   Prescribed By: Dr. Patricia Zavala      Informed patient to allow up to 24 to 48 Business hours for a call back from a nurse.

## 2024-04-14 PROBLEM — R26.2 DIFFICULTY WALKING: Status: ACTIVE | Noted: 2024-04-14

## 2024-04-14 PROBLEM — M25.511 RIGHT SHOULDER PAIN: Status: ACTIVE | Noted: 2024-04-14

## 2024-04-26 ENCOUNTER — OFFICE VISIT (OUTPATIENT)
Dept: FAMILY MEDICINE CLINIC | Facility: CLINIC | Age: 64
End: 2024-04-26
Payer: MEDICAID

## 2024-04-26 VITALS
RESPIRATION RATE: 21 BRPM | TEMPERATURE: 97 F | OXYGEN SATURATION: 100 % | BODY MASS INDEX: 35 KG/M2 | SYSTOLIC BLOOD PRESSURE: 132 MMHG | WEIGHT: 190 LBS | DIASTOLIC BLOOD PRESSURE: 76 MMHG

## 2024-04-26 DIAGNOSIS — Z71.6 ENCOUNTER FOR SMOKING CESSATION COUNSELING: ICD-10-CM

## 2024-04-26 DIAGNOSIS — Z87.891 QUIT SMOKING: ICD-10-CM

## 2024-04-26 DIAGNOSIS — Z51.81 THERAPEUTIC DRUG MONITORING: Primary | ICD-10-CM

## 2024-04-26 PROCEDURE — 99213 OFFICE O/P EST LOW 20 MIN: CPT | Performed by: FAMILY MEDICINE

## 2024-04-26 NOTE — PROGRESS NOTES
Laine Godinez is a 63 year old female.     Chief Complaint   Patient presents with    Smoking     Cessation progress         HPI:       Smoking cessation:  Quit smoking around 4/15/2024.  She is taking the generic Chantix 1 mg dose twice a day.  Side effect she noticed is night sweats and a \"crazy dream\" one time.  Reports breathing is better, not coughing up phlegm anymore, not having to use inhaler, shoulder pain has improved and knee pain improved.  Still has the knee and shoulder pain but is better.  Walking now without the cane.      She is doing her home exercise program for her knee pain.      Had sharp pain in her chest that occurred 3 times over 2-3 days that she hasn't had since, lasted seconds, laying in bed when it happened.          Wt Readings from Last 6 Encounters:   04/26/24 190 lb (86.2 kg)   03/15/24 192 lb (87.1 kg)   09/06/23 189 lb 6.4 oz (85.9 kg)   08/25/23 189 lb (85.7 kg)   08/02/23 189 lb (85.7 kg)   06/09/23 189 lb (85.7 kg)      Body mass index is 34.75 kg/m².        Current Outpatient Medications   Medication Sig Dispense Refill    hydroCHLOROthiazide 25 MG Oral Tab Take 1 tablet (25 mg total) by mouth every morning. 90 tablet 3    cholecalciferol (VITAMIN D3) 125 MCG (5000 UT) Oral Cap Take 1 capsule (5,000 Units total) by mouth daily.      ipratropium-albuterol (COMBIVENT RESPIMAT)  MCG/ACT Inhalation Aero Soln Inhale 1 puff into the lungs every 4 (four) hours as needed. (Patient not taking: Reported on 4/26/2024) 12 g 3      Past Medical History:    Acute mastitis of left breast    Arthritis    Very painful most days.    Cervicalgia    Class 2 severe obesity due to excess calories with serious comorbidity and body mass index (BMI) of 36.0 to 36.9 in adult (HCC)    Associated with hypertension and hypercholesterolemia    Current smoker    Diffusion capacity of lung (dl), decreased    severe     Essential hypertension    Fibroadenosis of breast    Hematochezia    High blood  pressure    History of fusion of cervical spine    History of hernia repair    History of umbilical hernia repair    Hyperlipidemia    Hypertension goal BP (blood pressure) < 130/80    Lung nodule    Medication side effect    possibly from Celebrex, see above     Osteoarthritis of right hip    Osteoarthrosis, unspecified whether generalized or localized, unspecified site    Osteopenia    Paresthesia and pain of both upper extremities    Problems with swallowing    Rectal bleeding    Renal cyst    Renal cyst, right    Upper to midpole 6.7cm on CT done at [facility] 2017     Right-sided chest wall pain    Likely Zoster    Shoulder pain, left    Shoulder pain, right    Solitary cyst of breast      Past Surgical History:   Procedure Laterality Date    Colonoscopy      Colonoscopy  2017    Hernia surgery  2013    Hip replacement surgery      Right hip total replacement    Hysterectomy      1985    Bud localization wire 1 site right (cpt=19281)      loc, , benign    Needle biopsy right            1974    Oophorectomy      1985    Other surgical history  2010    Neck hernated disc    Other surgical history Right 2023    Soft Tissue removal on the right arm    Spine surgery procedure unlisted  2011    \"neck surgery,\" [facility], [place], [place]    Tonsillectomy  1964    Total abdom hysterectomy  1985    Total hip replacement Left 2016    Dr. Greene at [facility]    Upper gi endoscopy performed  2017    Upper gi endoscopy,exam  2019    Dr. Jordon Arreola at [facility]       Social History:    Social History     Socioeconomic History    Marital status: Single   Tobacco Use    Smoking status: Former     Current packs/day: 0.00     Average packs/day: 0.2 packs/day for 40.2 years (10.1 ttl pk-yrs)     Types: Cigarettes     Start date:      Quit date: 2024     Years since quittin.0    Smokeless tobacco: Never     Tobacco comments:     2 or 3 cigarettes per day   Vaping Use    Vaping status: Never Used   Substance and Sexual Activity    Alcohol use: Yes     Comment: occasional Holidays    Drug use: Yes     Types: Cannabis     Comment: prn for pain - at night when she cannot sleep   Other Topics Concern    Blood Transfusions No    Caffeine Concern No    Sleep Concern No    Stress Concern No    Weight Concern Yes     Comment: Why weight go up an down,lose weight  gain it [facility] ....    Special Diet No    Exercise No    Seat Belt Yes         Family History   Problem Relation Age of Onset    Heart Attack Father     Heart Disorder Father     Pulmonary Disease Mother         COPD    Asthma Mother     Diabetes Mother     Heart Disorder Mother     Hypertension Mother     Cancer Brother     Diabetes Son     Hypertension Son     Diabetes Sister     Hypertension Brother     Obesity Brother      REVIEW OF SYSTEMS:   GENERAL HEALTH: Overall feels well.    SKIN: denies any unusual skin lesions or rashes   RESPIRATORY: As in HPI  CARDIOVASCULAR: Denies palpitations  VASCULAR: Denies LE edema, denies claudication type symptoms  NEURO: denies headaches/dizziness  PSYCH: denies depression and anxiety    Immunization History   Administered Date(s) Administered    Covid-19 Vaccine Pfizer 30 mcg/0.3 ml 10/08/2021, 10/29/2021    Covid-19 Vaccine Pfizer Saurav-Sucrose 30 mcg/0.3 ml 03/30/2022    TDAP 08/02/2023       EXAM:   /76   Temp 97.3 °F (36.3 °C) (Temporal)   Resp 21   Wt 190 lb (86.2 kg)   LMP  (LMP Unknown)   SpO2 100%   BMI 34.75 kg/m²   GENERAL: NAD.  Very pleasant well appearing overweight -American female  SKIN: no visible rashes  HEAD: NCAT  LUNGS: CTA A/P no wheezes/ronchi/rales/crackles  CARDIO: RRR, +S1/S2, no mm  VASCULAR: No lower extremity edema  EXTREMITIES: no cyanosis or clubbing  NEURO: Alert and Oriented x3.   PSYCH: Affect normal.  Normal thought content.          ASSESSMENT AND PLAN:       Encounter  Diagnoses   Name Primary?    Therapeutic drug monitoring Yes    Encounter for smoking cessation counseling     Quit smoking        1. Therapeutic drug monitoring  2. Encounter for smoking cessation counseling  3. Quit smoking  Patient doing very well on generic Chantix, she has quit smoking.  Recommend continue to take generic Chantix and if she continues to have the night sweats recommend cut down dosing by taking half tab instead of a full tab once or twice a day.  Recommend continue with the generic Chantix for as long as the next 2 to 3 months if needed.        Return in about 3 months (around 8/5/2024) for Annual Wellness Visit/Chronic conditions. Sooner if needed..

## 2024-05-07 DIAGNOSIS — Z71.6 ENCOUNTER FOR SMOKING CESSATION COUNSELING: ICD-10-CM

## 2024-05-07 DIAGNOSIS — F17.210 CIGARETTE SMOKER: ICD-10-CM

## 2024-05-07 DIAGNOSIS — Z76.89 ENCOUNTER FOR NEW MEDICATION PRESCRIPTION: ICD-10-CM

## 2024-05-07 RX ORDER — VARENICLINE TARTRATE 1 MG/1
TABLET, FILM COATED ORAL
Qty: 60 TABLET | Refills: 1 | OUTPATIENT
Start: 2024-05-07

## 2024-05-07 NOTE — TELEPHONE ENCOUNTER
Medication was discontinued. Patient is no longer taking.    Requested Prescriptions     Refused Prescriptions Disp Refills    VARENICLINE 1 MG Oral Tab [Pharmacy Med Name: VARENICLINE TARTRATE 1MG TAB] 60 tablet 1     Sig: TAKE ONE TABLET BY MOUTH TWICE A DAY . START AFTER COMPLETING SEVEN DAYS OF THE 0.5 MG DOSE     Refused By: SHARYN GONZALES     Reason for Refusal: Refill not appropriate

## 2024-05-30 ENCOUNTER — OFFICE VISIT (OUTPATIENT)
Dept: FAMILY MEDICINE CLINIC | Facility: CLINIC | Age: 64
End: 2024-05-30

## 2024-05-30 VITALS
RESPIRATION RATE: 18 BRPM | TEMPERATURE: 97 F | BODY MASS INDEX: 35 KG/M2 | WEIGHT: 192 LBS | DIASTOLIC BLOOD PRESSURE: 76 MMHG | HEART RATE: 65 BPM | OXYGEN SATURATION: 98 % | SYSTOLIC BLOOD PRESSURE: 132 MMHG

## 2024-05-30 DIAGNOSIS — L02.212 ABSCESS OF BACK: Primary | ICD-10-CM

## 2024-05-30 PROCEDURE — 99213 OFFICE O/P EST LOW 20 MIN: CPT | Performed by: FAMILY MEDICINE

## 2024-05-30 RX ORDER — SULFAMETHOXAZOLE AND TRIMETHOPRIM 800; 160 MG/1; MG/1
1 TABLET ORAL 2 TIMES DAILY
Qty: 20 TABLET | Refills: 0 | Status: SHIPPED | OUTPATIENT
Start: 2024-05-30 | End: 2024-06-09

## 2024-05-30 NOTE — PROGRESS NOTES
Laine Godinez is a 63 year old female.     Chief Complaint   Patient presents with    Bump     C/o possibly infected bump on back that has drained and is slightly sore.  Past week         HPI:       Derm problem:  Last week patient states she \"popped\" a bump on her back.  It drained varying colors from black to white, and had a bad odor.  Currently not painful.          Wt Readings from Last 6 Encounters:   05/30/24 192 lb (87.1 kg)   04/26/24 190 lb (86.2 kg)   03/15/24 192 lb (87.1 kg)   09/06/23 189 lb 6.4 oz (85.9 kg)   08/25/23 189 lb (85.7 kg)   08/02/23 189 lb (85.7 kg)      Body mass index is 35.12 kg/m².        Current Outpatient Medications   Medication Sig Dispense Refill    sulfamethoxazole-trimethoprim -160 MG Oral Tab per tablet Take 1 tablet by mouth 2 (two) times daily for 10 days. 20 tablet 0    ipratropium-albuterol (COMBIVENT RESPIMAT)  MCG/ACT Inhalation Aero Soln Inhale 1 puff into the lungs every 4 (four) hours as needed. 12 g 3    hydroCHLOROthiazide 25 MG Oral Tab Take 1 tablet (25 mg total) by mouth every morning. 90 tablet 3    cholecalciferol (VITAMIN D3) 125 MCG (5000 UT) Oral Cap Take 1 capsule (5,000 Units total) by mouth daily.        Past Medical History:    Acute mastitis of left breast    Arthritis    Very painful most days.    Cervicalgia    Class 2 severe obesity due to excess calories with serious comorbidity and body mass index (BMI) of 36.0 to 36.9 in adult (HCC)    Associated with hypertension and hypercholesterolemia    Current smoker    Diffusion capacity of lung (dl), decreased    severe     Essential hypertension    Fibroadenosis of breast    Hematochezia    High blood pressure    History of fusion of cervical spine    History of hernia repair    History of umbilical hernia repair    Hyperlipidemia    Hypertension goal BP (blood pressure) < 130/80    Lung nodule    Medication side effect    possibly from Celebrex, see above     Osteoarthritis of right hip     Osteoarthrosis, unspecified whether generalized or localized, unspecified site    Osteopenia    Paresthesia and pain of both upper extremities    Problems with swallowing    Rectal bleeding    Renal cyst    Renal cyst, right    Upper to midpole 6.7cm on CT done at [facility] 2017     Right-sided chest wall pain    Likely Zoster    Shoulder pain, left    Shoulder pain, right    Solitary cyst of breast      Past Surgical History:   Procedure Laterality Date    Colonoscopy      Colonoscopy  2017    Hernia surgery  2013    Hip replacement surgery  2015    Right hip total replacement    Hysterectomy      1985    Bud localization wire 1 site right (cpt=19281)      loc, , benign    Needle biopsy right            1974    Oophorectomy      1985    Other surgical history  2010    Neck hernated disc    Other surgical history Right 2023    Soft Tissue removal on the right arm    Spine surgery procedure unlisted  2011    \"neck surgery,\" [place], [place]    Tonsillectomy  1964    Total abdom hysterectomy  1985    Total hip replacement Left 2016    Dr. Greene at [facility]    Upper gi endoscopy performed  2017    Upper gi endoscopy,exam  2019    [facility] Jordon [facility] at [facility]       Social History:    Social History     Socioeconomic History    Marital status: Single   Tobacco Use    Smoking status: Former     Current packs/day: 0.00     Average packs/day: 0.2 packs/day for 40.2 years (10.1 ttl pk-yrs)     Types: Cigarettes     Start date:      Quit date: 2024     Years since quittin.1    Smokeless tobacco: Never    Tobacco comments:     2 or 3 cigarettes per day   Vaping Use    Vaping status: Never Used   Substance and Sexual Activity    Alcohol use: Yes     Comment: occasional Holidays    Drug use: Yes     Types: Cannabis     Comment: prn for pain - at night when she cannot sleep   Other Topics  Concern    Blood Transfusions No    Caffeine Concern No    Sleep Concern No    Stress Concern No    Weight Concern Yes     Comment: Why weight go up an down,lose weight  gain it [facility] ....    Special Diet No    Exercise No    Seat Belt Yes         Family History   Problem Relation Age of Onset    Heart Attack Father     Heart Disorder Father     Pulmonary Disease Mother         COPD    Asthma Mother     Diabetes Mother     Heart Disorder Mother     Hypertension Mother     Cancer Brother     Diabetes Son     Hypertension Son     Diabetes Sister     Hypertension Brother     Obesity Brother      REVIEW OF SYSTEMS:   GENERAL HEALTH: Overall feels well.   SKIN: As in HPI  RESPIRATORY: Denies: YEE/MERCHANT/Cough  CARDIOVASCULAR: Denies CP/palpitations  NEURO: denies headaches/dizziness  PSYCH: denies depression and anxiety    Immunization History   Administered Date(s) Administered    Covid-19 Vaccine Pfizer 30 mcg/0.3 ml 10/08/2021, 10/29/2021    Covid-19 Vaccine Pfizer Saurav-Sucrose 30 mcg/0.3 ml 03/30/2022    TDAP 08/02/2023       EXAM:   /76   Pulse 65   Temp 97.2 °F (36.2 °C) (Temporal)   Resp 18   Wt 192 lb (87.1 kg)   LMP  (LMP Unknown)   SpO2 98%   BMI 35.12 kg/m²   GENERAL: NAD, pleasant well-appearing -American female  SKIN/Back: Right upper back with subcutaneous mass that is well-demarcated, nontender to palpation, no rubor, no calor  HEAD: NCAT  VASCULAR: No extremity edema  EXTREMITIES: no cyanosis or clubbing  NEURO: Alert and Oriented x3.   PSYCH: Affect normal.  Normal thought content.        ASSESSMENT AND PLAN:       Encounter Diagnosis   Name Primary?    Abscess of back Yes       1. Abscess of back  Currently without active infection as nontender to palpation, no increased warmth and no erythema.  Patient referred to Dr. Grant, surgeon, for definitive care as this has been recurrent in the same area.  Patient provided prescription for Bactrim DS in case of worsening prior to seeing  Dr. Grant.    - sulfamethoxazole-trimethoprim -160 MG Oral Tab per tablet; Take 1 tablet by mouth 2 (two) times daily for 10 days.  Dispense: 20 tablet; Refill: 0  - Surgery Referral - In Network          Meds & Refills for this Visit:  Requested Prescriptions     Signed Prescriptions Disp Refills    sulfamethoxazole-trimethoprim -160 MG Oral Tab per tablet 20 tablet 0     Sig: Take 1 tablet by mouth 2 (two) times daily for 10 days.       Imaging & Consults:  SURGERY - INTERNAL    Return in about 2 months (around 8/5/2024) for Annual wellness visit and hypertension.  Sooner if needed..

## 2024-09-04 ENCOUNTER — TELEPHONE (OUTPATIENT)
Dept: FAMILY MEDICINE CLINIC | Facility: CLINIC | Age: 64
End: 2024-09-04

## 2024-09-04 DIAGNOSIS — R05.3 CHRONIC COUGH: ICD-10-CM

## 2024-09-04 DIAGNOSIS — F17.210 CIGARETTE SMOKER: ICD-10-CM

## 2024-09-04 RX ORDER — IPRATROPIUM BROMIDE AND ALBUTEROL 20; 100 UG/1; UG/1
1 SPRAY, METERED RESPIRATORY (INHALATION) EVERY 4 HOURS PRN
Qty: 12 G | Refills: 1 | Status: SHIPPED | OUTPATIENT
Start: 2024-09-04

## 2024-09-04 NOTE — TELEPHONE ENCOUNTER
Requested Prescriptions     Pending Prescriptions Disp Refills    ipratropium-albuterol (COMBIVENT RESPIMAT)  MCG/ACT Inhalation Aero Soln 12 g 3     Sig: Inhale 1 puff into the lungs every 4 (four) hours as needed.       Last Refill: 3/28    Last OV: 5/30

## 2024-09-04 NOTE — TELEPHONE ENCOUNTER
Laine Godinez requesting medication refill for ipratropium-albuterol (COMBIVENT RESPIMAT)  MCG/ACT Inhalation Aero Soln .    LOV: 5/30/2024   Prescribed By: Dr. Patricia Zavala   Pharmacy Location: [facility]    Next Appointment: Visit date not found      Informed patient to allow up to 24 to 48 business hours before checking with Pharmacy.

## 2024-09-05 NOTE — TELEPHONE ENCOUNTER
Spoke with patient. Refill was sent to patient's pharmacy 9/4.  Patient is going to stop in [facility] and check on script. Patient will call tomorrow if there is any issues with retrieving her medication.

## 2024-09-05 NOTE — TELEPHONE ENCOUNTER
Patient calling to check status on inhaler. Patient called [facility] who said they don't have prescription request.    Please review and advise.

## 2024-09-06 RX ORDER — IPRATROPIUM BROMIDE AND ALBUTEROL 20; 100 UG/1; UG/1
1 SPRAY, METERED RESPIRATORY (INHALATION) EVERY 4 HOURS PRN
Qty: 12 G | Refills: 1 | Status: CANCELLED | OUTPATIENT
Start: 2024-09-06

## 2024-09-06 NOTE — TELEPHONE ENCOUNTER
Patient states if nurse can call in medication. Was not available at the pharmacy, she was advised to have someone from office call it in.   [id number] - [place], [place] - [address] [phone], [phone] [[id number]]

## 2024-09-06 NOTE — TELEPHONE ENCOUNTER
Called medication in to [facility] voice mail and let them know that a prior auth was already started.  Anything further to contact patient since she is a new patient for them

## 2024-09-06 NOTE — TELEPHONE ENCOUNTER
Spoke with pharmacist Arielle. She stated that they did not have anything on file for Patient. Per our records medication was sent to [facility] [address]. Will resend medication.

## 2024-09-23 ENCOUNTER — TELEPHONE (OUTPATIENT)
Dept: FAMILY MEDICINE CLINIC | Facility: CLINIC | Age: 64
End: 2024-09-23

## 2024-09-27 ENCOUNTER — OFFICE VISIT (OUTPATIENT)
Dept: FAMILY MEDICINE CLINIC | Facility: CLINIC | Age: 64
End: 2024-09-27
Payer: MEDICAID

## 2024-09-27 VITALS
SYSTOLIC BLOOD PRESSURE: 132 MMHG | HEART RATE: 76 BPM | RESPIRATION RATE: 17 BRPM | WEIGHT: 197 LBS | OXYGEN SATURATION: 98 % | TEMPERATURE: 98 F | BODY MASS INDEX: 36.72 KG/M2 | HEIGHT: 61.42 IN | DIASTOLIC BLOOD PRESSURE: 80 MMHG

## 2024-09-27 DIAGNOSIS — J30.9 ALLERGIC RHINITIS, UNSPECIFIED SEASONALITY, UNSPECIFIED TRIGGER: ICD-10-CM

## 2024-09-27 DIAGNOSIS — Z79.899 ENCOUNTER FOR LONG-TERM CURRENT USE OF MEDICATION: ICD-10-CM

## 2024-09-27 DIAGNOSIS — Z12.31 ENCOUNTER FOR SCREENING MAMMOGRAM FOR MALIGNANT NEOPLASM OF BREAST: ICD-10-CM

## 2024-09-27 DIAGNOSIS — Z00.00 ROUTINE GENERAL MEDICAL EXAMINATION AT A HEALTH CARE FACILITY: Primary | ICD-10-CM

## 2024-09-27 DIAGNOSIS — E66.01 CLASS 2 SEVERE OBESITY DUE TO EXCESS CALORIES WITH SERIOUS COMORBIDITY AND BODY MASS INDEX (BMI) OF 36.0 TO 36.9 IN ADULT (HCC): ICD-10-CM

## 2024-09-27 DIAGNOSIS — Z78.0 POSTMENOPAUSAL: ICD-10-CM

## 2024-09-27 DIAGNOSIS — I10 PRIMARY HYPERTENSION: ICD-10-CM

## 2024-09-27 DIAGNOSIS — M85.89 OSTEOPENIA OF MULTIPLE SITES: ICD-10-CM

## 2024-09-27 DIAGNOSIS — M25.551 RIGHT HIP PAIN: ICD-10-CM

## 2024-09-27 DIAGNOSIS — Z96.641 HISTORY OF RIGHT HIP REPLACEMENT: ICD-10-CM

## 2024-09-27 PROBLEM — E66.812 CLASS 2 SEVERE OBESITY DUE TO EXCESS CALORIES WITH SERIOUS COMORBIDITY AND BODY MASS INDEX (BMI) OF 36.0 TO 36.9 IN ADULT (HCC): Status: ACTIVE | Noted: 2024-09-27

## 2024-09-27 PROCEDURE — 99214 OFFICE O/P EST MOD 30 MIN: CPT | Performed by: FAMILY MEDICINE

## 2024-09-27 PROCEDURE — 99396 PREV VISIT EST AGE 40-64: CPT | Performed by: FAMILY MEDICINE

## 2024-09-27 RX ORDER — HYDROCHLOROTHIAZIDE 25 MG/1
25 TABLET ORAL EVERY MORNING
Qty: 90 TABLET | Refills: 1 | Status: SHIPPED | OUTPATIENT
Start: 2024-09-27

## 2024-09-27 NOTE — PROGRESS NOTES
Chief Complaint   Patient presents with    Wellness Visit    Hypertension    Headache    Other     Handicap placard permanent     Obesity    Allergies    Hip Pain           HPI:   Laine Godinez is a 63 year old female       Right hip pain:  Worsening.  Patient attributes the worsening to weight gain.  History of right hip surgery.      Complains of allergy symptoms:  Sneezing and itchy nose and runny nose for weeks or months.      Chronic bronchitis:  Former smoker, patient quit smoking April 2024 and confirms that she has not restarted smoking.  Patient states she was able to fill and  the Combivent.        Obesity:  Complains of difficulty losing weight.  Admits diet could be better and currently unable to exercise due to musculoskeletal issues.  Declines metformin.      Hypertension:  Stable.  Severity is mild, patient is on 1 agent to control her hypertension hydrochlorothiazide 25 mg daily.  Tolerating medication well, no adverse effects to hydrochlorothiazide dose by patient.  Use of hydrochlorothiazide associated with hypokalemia.      Symptoms: denies discharge, itching, burning or dysuria.     Hysterectomy for benign reasons.      Last colonoscopy: UTD   Last mammogram: due      Wt Readings from Last 6 Encounters:   09/27/24 197 lb (89.4 kg)   05/30/24 192 lb (87.1 kg)   04/26/24 190 lb (86.2 kg)   03/15/24 192 lb (87.1 kg)   09/06/23 189 lb 6.4 oz (85.9 kg)   08/25/23 189 lb (85.7 kg)     Body mass index is 36.72 kg/m².       Patient Active Problem List   Diagnosis    Chronic cough    Diffusion capacity of lung (dl), decreased    Osteopenia    Pulmonary nodules    Pancreatic lesion (HCC)    Primary hypertension    Chronic pain    Primary localized osteoarthritis of right hip    Shoulder pain, bilateral    Status post right hip replacement    Hoarseness of voice    Hypercholesterolemia    Lymphocytosis    Chronic pain of right knee    Renal cyst, right    Other specified joint disorders, left  hip    Sacroiliac pain    Chronic left hip pain    Vitamin D deficiency    Urinary, incontinence, stress female    Hip instability, left    Lesion of pancreas ([facility])    Cigarette smoker    Biceps tendinitis of right upper extremity    Rotator cuff disorder, right    Thoracic aortic ectasia ([facility])    Family history of thoracic aortic aneurysm    Exertional chest pain    Dyspnea on exertion    Mass of soft tissue of shoulder    Hypokalemia    History of cervical spinal surgery    Chronic right-sided thoracic back pain    Numbness    Encounter for smoking cessation counseling    Abnormal weight loss    Imaging of gastrointestinal tract abnormal    Knee instability, right    Lipoma of right upper extremity    Thrombocytosis    Impaired fasting glucose    Pain of right lower extremity    Simple chronic bronchitis ([facility])    Class 1 obesity due to excess calories with serious comorbidity and body mass index (BMI) of 34.0 to 34.9 in adult    Difficulty walking    Right shoulder pain    Abscess of back    Class 2 severe obesity due to excess calories with serious comorbidity and body mass index (BMI) of 36.0 to 36.9 in adult ([facility])    Encounter for long-term current use of medication    Allergic rhinitis     Current Outpatient Medications   Medication Sig Dispense Refill    hydroCHLOROthiazide 25 MG Oral Tab Take 1 tablet (25 mg total) by mouth every morning. 90 tablet 1    ipratropium-albuterol (COMBIVENT RESPIMAT)  MCG/ACT Inhalation Aero Soln Inhale 1 puff into the lungs every 4 (four) hours as needed. 12 g 1    cholecalciferol (VITAMIN D3) 125 MCG (5000 UT) Oral Cap Take 1 capsule (5,000 Units total) by mouth daily.        Past Medical History:    Acute mastitis of left breast    Arthritis    Very painful most days.    Cervicalgia    Class 2 severe obesity due to excess calories with serious comorbidity and body mass index (BMI) of 36.0 to 36.9 in adult ([facility])    Associated with hypertension and hypercholesterolemia     Current smoker    Diffusion capacity of lung (dl), decreased    severe     Essential hypertension    Fibroadenosis of breast    Hematochezia    High blood pressure    History of fusion of cervical spine    History of hernia repair    History of umbilical hernia repair    Hyperlipidemia    Hypertension goal BP (blood pressure) < 130/80    Lung nodule    Medication side effect    possibly from Celebrex, see above     Osteoarthritis of right hip    Osteoarthrosis, unspecified whether generalized or localized, unspecified site    Osteopenia    Paresthesia and pain of both upper extremities    Problems with swallowing    Rectal bleeding    Renal cyst    Renal cyst, right    Upper to midpole 6.7cm on CT done at [facility] 2017     Right-sided chest wall pain    Likely Zoster    Shoulder pain, left    Shoulder pain, right    Solitary cyst of breast      Past Surgical History:   Procedure Laterality Date    Colonoscopy      Colonoscopy  2017    Hernia surgery  2013    Hip replacement surgery  2015    Right hip total replacement    Hysterectomy      1985    Bud localization wire 1 site right (cpt=19281)      loc, , benign    Needle biopsy right      2012      1974    Oophorectomy      1985    Other surgical history  2010    Neck hernated disc    Other surgical history Right 2023    Soft Tissue removal on the right arm    Spine surgery procedure unlisted  2011    \"neck surgery,\" [facility], [place], [place]    Tonsillectomy  1964    Total abdom hysterectomy  1985    Total hip replacement Left 2016    Dr. Greene at [facility]    Upper gi endoscopy performed  2017    Upper gi endoscopy,exam  2019    Dr. Jordon Arreola at [facility]       Family History   Problem Relation Age of Onset    Heart Attack Father     Heart Disorder Father     Pulmonary Disease Mother         COPD    Asthma Mother     Diabetes Mother     Heart Disorder  Mother     Hypertension Mother     Cancer Brother     Diabetes Son     Hypertension Son     Diabetes Sister     Hypertension Brother     Obesity Brother       Social History:   Social History     Socioeconomic History    Marital status: Single   Tobacco Use    Smoking status: Former     Current packs/day: 0.00     Average packs/day: 0.2 packs/day for 40.2 years (10.1 ttl pk-yrs)     Types: Cigarettes     Start date:      Quit date: 2024     Years since quittin.4    Smokeless tobacco: Never    Tobacco comments:     2 or 3 cigarettes per day   Vaping Use    Vaping status: Never Used   Substance and Sexual Activity    Alcohol use: Yes     Comment: occasional Holidays    Drug use: Yes     Types: Cannabis     Comment: prn for pain - at night when she cannot sleep   Other Topics Concern    Blood Transfusions No    Caffeine Concern No    Sleep Concern No    Stress Concern No    Weight Concern Yes     Comment: Why weight go up an down,lose weight  gain it [facility] ....    Special Diet No    Exercise No    Seat Belt Yes     Occ: homemaker. Marital Status: single. Children: 1.   Exercise:  walking .  Diet:  stopped frying food     REVIEW OF SYSTEMS:   GENERAL: Overall feels well otherwise  SKIN: denies any unusual skin lesions or rashes  EYES: denies vision changes  HEENT: denies upper respiratory symptoms  LUNGS: denies shortness of breath with exertion  CHEST:  denies breast changes or pain  CARDIOVASCULAR: denies chest pain or tightness on exertion  VASCULAR: No lower extremity swelling  GI: denies abdominal pain, bowel movement changes, blood in stool  : No complaint of urinary problems, vaginal discharge or discomfort  MUSCULOSKELETAL: As in HPI  NEURO: denies headaches or dizziness  PSYCH: denies depression or anxiety  ENDOCRINE: No complaints of temperature intolerance, polyuria, or excessive sweating.  LYMPHATICS: No complaints of swollen glands      EXAM:   /80   Pulse 76   Temp 97.9 °F (36.6  °C) (Temporal)   Resp 17   Ht 5' 1.42\" (1.56 m)   Wt 197 lb (89.4 kg)   LMP  (LMP Unknown)   SpO2 98%   BMI 36.72 kg/m²   Body mass index is 36.72 kg/m².   GENERAL: NAD, Pleasant non-ill-appearing -American female.  Occasional moist cough  SKIN: No visible rashes or suspicious lesions.  HEENT: atraumatic, normocephalic, EACs and TMs clear normal bilaterally.  EYES: EOMI, sclera, conjunctiva are clear  NECK: supple, no adenopathy/thyromegaly/masses  LUNGS: Clear to auscultation bilateral, no rales, rhonchi or wheezing  CARDIO: RRR without murmur normal S1S2  ABD: Obese. Soft, non tender to palpation.  No masses, HSM, or pulsations appreciated.  MUSCULOSKELETAL: Has some difficulty due to right hip pain but able to get up and down off of exam table on her own.  EXTREMITIES: no clubbing, cyanosis, or edema  NEURO: Alert and oriented x3.   PSYCH: normal affect      Immunization History   Administered Date(s) Administered    Covid-19 Vaccine Pfizer 30 mcg/0.3 ml 10/08/2021, 10/29/2021    Covid-19 Vaccine Pfizer Saurav-Sucrose 30 mcg/0.3 ml 03/30/2022    TDAP 08/02/2023       DATA:      Thyroid:    Lab Results   Component Value Date    TSH 0.563 06/02/2023    TSH 0.74 05/24/2022    TSH 0.672 06/04/2021    T4F 1.2 06/02/2023    T4F 1.6 05/24/2022    T4F 1.2 06/04/2021          Lab Results   Component Value Date    A1C 5.0 06/02/2023    A1C 4.8 05/24/2022    A1C 5.1 10/29/2014    EAG 97 06/02/2023     10/29/2014     Lab Results   Component Value Date    CHOLEST 195 06/02/2023    CHOLEST 226 (H) 05/24/2022    CHOLEST 226 (H) 06/04/2021     Lab Results   Component Value Date    HDL 94 (H) 06/02/2023    HDL 88 05/24/2022    HDL 69 (H) 06/04/2021     Lab Results   Component Value Date    LDL 83 06/02/2023     (H) 05/24/2022     (H) 06/04/2021     Lab Results   Component Value Date    TRIG 102 06/02/2023    TRIG 105 05/24/2022    TRIG 92 06/04/2021     Lab Results   Component Value Date    AST 20  06/02/2023    AST 17 05/24/2022    AST 17 06/04/2021     Lab Results   Component Value Date    ALT 23 06/02/2023    ALT 11 05/24/2022    ALT 15 06/04/2021     BMP:   No results found for: \"GLUCOSE\"  Lab Results   Component Value Date    K 2.9 (LL) 06/09/2023    K 3.0 (L) 06/02/2023    K 3.7 05/24/2022     Lab Results   Component Value Date    BUN 5 (L) 06/09/2023    BUN 9 06/02/2023    BUN 7 05/24/2022     Lab Results   Component Value Date    CREATSERUM 0.48 (L) 06/09/2023    CREATSERUM 0.66 06/02/2023    CREATSERUM 0.54 05/24/2022                 ASSESSMENT AND PLAN:   Laine Godinez is a 63 year old female who presents for a complete physical exam.        Encounter Diagnoses   Name Primary?    Routine general medical examination at a health care facility Yes    Encounter for screening mammogram for malignant neoplasm of breast     Primary hypertension     Encounter for long-term current use of medication     Allergic rhinitis, unspecified seasonality, unspecified trigger     Right hip pain     History of right hip replacement     Postmenopausal     Osteopenia of multiple sites     Class 2 severe obesity due to excess calories with serious comorbidity and body mass index (BMI) of 36.0 to 36.9 in adult (HCC)            1. Routine general medical examination at a health care facility  Patient provided handout on women's health and prevention.   Routine health profile labs pending.    2. Encounter for screening mammogram for malignant neoplasm of breast  M due on or shortly after 10/6/2024.    - [facility] MARIO 2D+3D SCREENING BILAT (CPT=77067/27678); Future    3. Primary hypertension  Fairly well-controlled, systolic and diastolic could be a little better.  Recommend increase hydrochlorothiazide to 25 mg every morning from 12.5 mg every morning.  Labs ordered and pending.  Follow-up on hypertension in 6 months, sooner if needed.    - hydroCHLOROthiazide 25 MG Oral Tab; Take 1 tablet (25 mg total) by mouth every morning.   Dispense: 90 tablet; Refill: 1    - CBC With Differential With Platelet; Future  - Comp Metabolic Panel (14); Future  - Lipid Panel; Future  - Assay, Thyroid Stim Hormone; Future  - Free T4, (Free Thyroxine); Future    4. Encounter for long-term current use of medication  Medication use, risks, benefits, side effects and precautions discussed, patient verbalizes understanding. Questions encouraged and answered to patient's satisfaction.    - hydroCHLOROthiazide 25 MG Oral Tab; Take 1 tablet (25 mg total) by mouth every morning.  Dispense: 90 tablet; Refill: 1    5. Allergic rhinitis, unspecified seasonality, unspecified trigger  Recommend evaluate for environmental allergies.  Okay to take OTC antihistamine such as Claritin or Zyrtec.  Follow-up pending results.    - Allergens, Zone 8 [E]; Future    6. Right hip pain  7. History of right hip replacement  Patient currently declines to follow-up with her surgeon and declines physical therapy.  I discussed with patient if she changes her mind regarding physical therapy to let me know, I would order physical therapy for her.    8. Postmenopausal  9. Osteopenia of multiple sites  Postmenopausal osteopenia of multiple sites.  Recommend reevaluate DEXA, further recommendations pending results.    - XR DEXA BONE DENSITOMETRY (CPT=77080); Future    10. Class 2 severe obesity due to excess calories with serious comorbidity and body mass index (BMI) of 36.0 to 36.9 in adult (HCC)  Patient counseled on healthy diet and to exercise on a regular basis.  Recommend healthy diet such as Mediterranean diet.  Currently exercise is limited due to right hip pain.  I discussed with patient that her insurance does not cover GLP-1 type medications, she did not seem particularly interested in this anyways.  She is not interested in metformin.          Meds & Refills for this Visit:  Requested Prescriptions     Signed Prescriptions Disp Refills    hydroCHLOROthiazide 25 MG Oral Tab 90  tablet 1     Sig: Take 1 tablet (25 mg total) by mouth every morning.       Imaging & Consults:  [facility] MARIO 2D+3D SCREENING BILAT (CPT=77067/61880)  XR DEXA BONE DENSITOMETRY (CPT=77080)    Return in about 6 months (around 3/27/2025) for Chronic bronchitis and hypertension.  Sooner if needed.

## 2024-09-28 PROBLEM — J30.9 ALLERGIC RHINITIS: Status: ACTIVE | Noted: 2024-09-28

## 2024-10-04 ENCOUNTER — LAB ENCOUNTER (OUTPATIENT)
Dept: LAB | Age: 64
End: 2024-10-04
Attending: FAMILY MEDICINE
Payer: MEDICAID

## 2024-10-04 DIAGNOSIS — J30.9 ALLERGIC RHINITIS, UNSPECIFIED SEASONALITY, UNSPECIFIED TRIGGER: ICD-10-CM

## 2024-10-04 DIAGNOSIS — I10 PRIMARY HYPERTENSION: ICD-10-CM

## 2024-10-04 DIAGNOSIS — E87.6 HYPOKALEMIA: ICD-10-CM

## 2024-10-04 LAB
ALBUMIN SERPL-MCNC: 4.5 G/DL (ref 3.2–4.8)
ALBUMIN/GLOB SERPL: 1.6 {RATIO} (ref 1–2)
ALP LIVER SERPL-CCNC: 56 U/L
ALT SERPL-CCNC: 13 U/L
ANION GAP SERPL CALC-SCNC: 6 MMOL/L (ref 0–18)
AST SERPL-CCNC: 18 U/L (ref ?–34)
BASOPHILS # BLD AUTO: 0.05 X10(3) UL (ref 0–0.2)
BASOPHILS NFR BLD AUTO: 0.5 %
BILIRUB SERPL-MCNC: 0.7 MG/DL (ref 0.2–1.1)
BUN BLD-MCNC: 9 MG/DL (ref 9–23)
CALCIUM BLD-MCNC: 9.9 MG/DL (ref 8.7–10.4)
CHLORIDE SERPL-SCNC: 104 MMOL/L (ref 98–112)
CHOLEST SERPL-MCNC: 211 MG/DL (ref ?–200)
CO2 SERPL-SCNC: 29 MMOL/L (ref 21–32)
CREAT BLD-MCNC: 0.63 MG/DL
EGFRCR SERPLBLD CKD-EPI 2021: 99 ML/MIN/1.73M2 (ref 60–?)
EOSINOPHIL # BLD AUTO: 0.11 X10(3) UL (ref 0–0.7)
EOSINOPHIL NFR BLD AUTO: 1.1 %
ERYTHROCYTE [DISTWIDTH] IN BLOOD BY AUTOMATED COUNT: 15.2 %
FASTING PATIENT LIPID ANSWER: YES
FASTING STATUS PATIENT QL REPORTED: YES
GLOBULIN PLAS-MCNC: 2.9 G/DL (ref 2–3.5)
GLUCOSE BLD-MCNC: 117 MG/DL (ref 70–99)
HCT VFR BLD AUTO: 41.6 %
HDLC SERPL-MCNC: 81 MG/DL (ref 40–59)
HGB BLD-MCNC: 13.9 G/DL
IMM GRANULOCYTES # BLD AUTO: 0.03 X10(3) UL (ref 0–1)
IMM GRANULOCYTES NFR BLD: 0.3 %
LDLC SERPL CALC-MCNC: 112 MG/DL (ref ?–100)
LYMPHOCYTES # BLD AUTO: 4.69 X10(3) UL (ref 1–4)
LYMPHOCYTES NFR BLD AUTO: 46.3 %
MCH RBC QN AUTO: 30.4 PG (ref 26–34)
MCHC RBC AUTO-ENTMCNC: 33.4 G/DL (ref 31–37)
MCV RBC AUTO: 91 FL
MONOCYTES # BLD AUTO: 0.72 X10(3) UL (ref 0.1–1)
MONOCYTES NFR BLD AUTO: 7.1 %
NEUTROPHILS # BLD AUTO: 4.52 X10 (3) UL (ref 1.5–7.7)
NEUTROPHILS # BLD AUTO: 4.52 X10(3) UL (ref 1.5–7.7)
NEUTROPHILS NFR BLD AUTO: 44.7 %
NONHDLC SERPL-MCNC: 130 MG/DL (ref ?–130)
OSMOLALITY SERPL CALC.SUM OF ELEC: 288 MOSM/KG (ref 275–295)
PLATELET # BLD AUTO: 378 10(3)UL (ref 150–450)
POTASSIUM SERPL-SCNC: 3.2 MMOL/L (ref 3.5–5.1)
PROT SERPL-MCNC: 7.4 G/DL (ref 5.7–8.2)
RBC # BLD AUTO: 4.57 X10(6)UL
SODIUM SERPL-SCNC: 139 MMOL/L (ref 136–145)
T4 FREE SERPL-MCNC: 1.8 NG/DL (ref 0.8–1.7)
TRIGL SERPL-MCNC: 102 MG/DL (ref 30–149)
TSI SER-ACNC: 1.29 MIU/ML (ref 0.55–4.78)
VLDLC SERPL CALC-MCNC: 17 MG/DL (ref 0–30)
WBC # BLD AUTO: 10.1 X10(3) UL (ref 4–11)

## 2024-10-04 PROCEDURE — 86003 ALLG SPEC IGE CRUDE XTRC EA: CPT

## 2024-10-04 PROCEDURE — 36415 COLL VENOUS BLD VENIPUNCTURE: CPT

## 2024-10-04 PROCEDURE — 84443 ASSAY THYROID STIM HORMONE: CPT

## 2024-10-04 PROCEDURE — 83735 ASSAY OF MAGNESIUM: CPT

## 2024-10-04 PROCEDURE — 80061 LIPID PANEL: CPT

## 2024-10-04 PROCEDURE — 82785 ASSAY OF IGE: CPT

## 2024-10-04 PROCEDURE — 80053 COMPREHEN METABOLIC PANEL: CPT

## 2024-10-04 PROCEDURE — 85025 COMPLETE CBC W/AUTO DIFF WBC: CPT

## 2024-10-04 PROCEDURE — 84439 ASSAY OF FREE THYROXINE: CPT

## 2024-10-06 DIAGNOSIS — E87.6 HYPOKALEMIA: Primary | ICD-10-CM

## 2024-10-06 LAB — MAGNESIUM SERPL-MCNC: 1.9 MG/DL (ref 1.6–2.6)

## 2024-10-06 NOTE — PROGRESS NOTES
Please call patient and verify she has read, reviewed and understands my recommendations and the results message that I have sent to her as below.    Hi Laine, your potassium is still below normal.  This is most likely a side effect from the hydrochlorothiazide.  I recommend that we switch you to a different antihypertensive medication, I would recommend nifedipine.  Alternatively, I can prescribe prescription potassium tablets for you.  Please let me know which thing you would like to do above.  Thank you,  Dr. Zavala

## 2024-10-07 LAB
A ALTERNATA IGE QN: <0.1 KUA/L (ref ?–0.1)
A FUMIGATUS IGE QN: <0.1 KUA/L (ref ?–0.1)
AMER SYCAMORE IGE QN: <0.1 KUA/L (ref ?–0.1)
BERMUDA GRASS IGE QN: <0.1 KUA/L (ref ?–0.1)
BOXELDER IGE QN: <0.1 KUA/L (ref ?–0.1)
C HERBARUM IGE QN: <0.1 KUA/L (ref ?–0.1)
CALIF WALNUT IGE QN: 0.15 KUA/L (ref ?–0.1)
CAT DANDER IGE QN: <0.1 KUA/L (ref ?–0.1)
CMN PIGWEED IGE QN: <0.1 KUA/L (ref ?–0.1)
COMMON RAGWEED IGE QN: <0.1 KUA/L (ref ?–0.1)
COTTONWOOD IGE QN: <0.1 KUA/L (ref ?–0.1)
D FARINAE IGE QN: <0.1 KUA/L (ref ?–0.1)
D PTERONYSS IGE QN: <0.1 KUA/L (ref ?–0.1)
DOG DANDER IGE QN: <0.1 KUA/L (ref ?–0.1)
IGE SERPL-ACNC: 20.8 KU/L (ref 2–214)
M RACEMOSUS IGE QN: <0.1 KUA/L (ref ?–0.1)
MARSH ELDER IGE QN: <0.1 KUA/L (ref ?–0.1)
MOUSE EPITH IGE QN: <0.1 KUA/L (ref ?–0.1)
MT JUNIPER IGE QN: <0.1 KUA/L (ref ?–0.1)
P NOTATUM IGE QN: <0.1 KUA/L (ref ?–0.1)
PECAN/HICK TREE IGE QN: <0.1 KUA/L (ref ?–0.1)
ROACH IGE QN: <0.1 KUA/L (ref ?–0.1)
SALTWORT IGE QN: <0.1 KUA/L (ref ?–0.1)
SILVER BIRCH IGE QN: <0.1 KUA/L (ref ?–0.1)
TIMOTHY IGE QN: <0.1 KUA/L (ref ?–0.1)
WHITE ASH IGE QN: 0.12 KUA/L (ref ?–0.1)
WHITE ELM IGE QN: 0.31 KUA/L (ref ?–0.1)
WHITE MULBERRY IGE QN: <0.1 KUA/L (ref ?–0.1)
WHITE OAK IGE QN: <0.1 KUA/L (ref ?–0.1)

## 2024-10-09 NOTE — PROGRESS NOTES
Dr. Zavala is out of the office.  Your allergy testing did show mild allergies to elm tree, walnut tree and white [place].

## 2024-10-17 ENCOUNTER — TELEPHONE (OUTPATIENT)
Dept: FAMILY MEDICINE CLINIC | Facility: CLINIC | Age: 64
End: 2024-10-17

## 2024-10-17 NOTE — TELEPHONE ENCOUNTER
Patient was seen at the eye doctor today and her blood pressure was 151/90. Eye doctor recommend she call her pcp to inform.     Please review and advise.

## 2024-10-17 NOTE — TELEPHONE ENCOUNTER
Patient was at eye doctor today. Bp was elevated 151/90. Patient is concerned and would like to change her medication to the Nifedipine as Dr Zavala recommended on 10/6. Patient has been watching her diet and eating healthier. Patient will recheck her bp later today. Requested an appointment for follow up after her mammo and dexa scan and blood pressure. Appointment made.     Please review and advise.

## 2024-10-18 NOTE — TELEPHONE ENCOUNTER
Please have patient schedule appointment to see me for her hypertension, that blood pressure reading is anomalous.  Okay to overbook patient.

## 2024-10-21 NOTE — TELEPHONE ENCOUNTER
Future Appointments   Date Time Provider Department Center   10/24/2024 10:30 AM Patricia Zavala,  [facility] [place]     Patient needed 3 days in advance for transportation

## 2024-10-21 NOTE — TELEPHONE ENCOUNTER
Called patient and left voicemail to make an earlier appointment. Per Dr Sally jacinto to overbook.

## 2024-10-24 ENCOUNTER — OFFICE VISIT (OUTPATIENT)
Dept: FAMILY MEDICINE CLINIC | Facility: CLINIC | Age: 64
End: 2024-10-24
Payer: MEDICAID

## 2024-10-24 VITALS
WEIGHT: 197 LBS | OXYGEN SATURATION: 98 % | BODY MASS INDEX: 36.72 KG/M2 | HEIGHT: 61.42 IN | TEMPERATURE: 98 F | RESPIRATION RATE: 21 BRPM | SYSTOLIC BLOOD PRESSURE: 130 MMHG | DIASTOLIC BLOOD PRESSURE: 88 MMHG | HEART RATE: 85 BPM

## 2024-10-24 DIAGNOSIS — Z76.89 ENCOUNTER FOR NEW MEDICATION PRESCRIPTION: ICD-10-CM

## 2024-10-24 DIAGNOSIS — I10 PRIMARY HYPERTENSION: Primary | ICD-10-CM

## 2024-10-24 PROCEDURE — 99214 OFFICE O/P EST MOD 30 MIN: CPT | Performed by: FAMILY MEDICINE

## 2024-10-24 RX ORDER — METOPROLOL SUCCINATE 25 MG/1
TABLET, EXTENDED RELEASE ORAL
Qty: 30 TABLET | Refills: 1 | Status: SHIPPED | OUTPATIENT
Start: 2024-10-24

## 2024-10-24 NOTE — PROGRESS NOTES
Laine Godinez is a 63 year old female.     Chief Complaint   Patient presents with    Hypertension         HPI:     Hypertension:  Needs improvement.   Patient was at her optometrist office on 10/17/2024, Dr. García Gastelum, and blood pressure was noted to be 151/99.  Please note patient has had side effects to amlodipine, lisinopril, and losartan, that is why we will have patient try a beta-blocker.  Currently taking hydrochlorothiazide 25 mg every morning, tolerating well.  Diet: Low-salt               Exercise: Started exercising again            Wt Readings from Last 6 Encounters:   10/24/24 197 lb (89.4 kg)   09/27/24 197 lb (89.4 kg)   05/30/24 192 lb (87.1 kg)   04/26/24 190 lb (86.2 kg)   03/15/24 192 lb (87.1 kg)   09/06/23 189 lb 6.4 oz (85.9 kg)      Body mass index is 36.72 kg/m².        Current Outpatient Medications   Medication Sig Dispense Refill    metoprolol succinate ER 25 MG Oral Tablet 24 Hr Start 1/2 tab nightly for 1-2 weeks, then if tolerating increase to one full tab nightly 30 tablet 1    hydroCHLOROthiazide 25 MG Oral Tab Take 1 tablet (25 mg total) by mouth every morning. 90 tablet 1    ipratropium-albuterol (COMBIVENT RESPIMAT)  MCG/ACT Inhalation Aero Soln Inhale 1 puff into the lungs every 4 (four) hours as needed. 12 g 1    cholecalciferol (VITAMIN D3) 125 MCG (5000 UT) Oral Cap Take 1 capsule (5,000 Units total) by mouth daily.        Past Medical History:    Acute mastitis of left breast    Arthritis    Very painful most days.    Cervicalgia    Class 2 severe obesity due to excess calories with serious comorbidity and body mass index (BMI) of 36.0 to 36.9 in adult (HCC)    Associated with hypertension and hypercholesterolemia    Current smoker    Diffusion capacity of lung (dl), decreased    severe     Essential hypertension    Fibroadenosis of breast    Hematochezia    High blood pressure    History of fusion of cervical spine    History of hernia repair    History of  umbilical hernia repair    Hyperlipidemia    Hypertension goal BP (blood pressure) < 130/80    Lung nodule    Medication side effect    possibly from Celebrex, see above     Osteoarthritis of right hip    Osteoarthrosis, unspecified whether generalized or localized, unspecified site    Osteopenia    Paresthesia and pain of both upper extremities    Problems with swallowing    Rectal bleeding    Renal cyst    Renal cyst, right    Upper to midpole 6.7cm on CT done at [facility] 2017     Right-sided chest wall pain    Likely Zoster    Shoulder pain, left    Shoulder pain, right    Solitary cyst of breast      Past Surgical History:   Procedure Laterality Date    Colonoscopy      Colonoscopy  2017    Hernia surgery  2013    Hip replacement surgery      Right hip total replacement    Hysterectomy      1985    Bud localization wire 1 site right (cpt=19281)      loc, , benign    Needle biopsy right            1974    Oophorectomy      1985    Other surgical history  2010    Neck hernated disc    Other surgical history Right 2023    Soft Tissue removal on the right arm    Spine surgery procedure unlisted  2011    \"neck surgery,\" [facility], [place], [place]    Tonsillectomy  1964    Total abdom hysterectomy  1985    Total hip replacement Left 2016    Dr. Greene at [facility]    Upper gi endoscopy performed  2017    Upper gi endoscopy,exam  2019    Dr. Jrodon Arreola at [facility]       Social History:    Social History     Socioeconomic History    Marital status: Single   Tobacco Use    Smoking status: Former     Current packs/day: 0.00     Average packs/day: 0.2 packs/day for 40.2 years (10.1 ttl pk-yrs)     Types: Cigarettes     Start date:      Quit date: 2024     Years since quittin.5    Smokeless tobacco: Never    Tobacco comments:     2 or 3 cigarettes per day   Vaping Use    Vaping status: Never Used    Substance and Sexual Activity    Alcohol use: Yes     Comment: occasional Holidays    Drug use: Yes     Types: Cannabis     Comment: prn for pain - at night when she cannot sleep   Other Topics Concern    Blood Transfusions No    Caffeine Concern No    Sleep Concern No    Stress Concern No    Weight Concern Yes     Comment: Why weight go up an down,lose weight  gain it [facility] ....    Special Diet No    Exercise No    Seat Belt Yes         Family History   Problem Relation Age of Onset    Heart Attack Father     Heart Disorder Father     Pulmonary Disease Mother         COPD    Asthma Mother     Diabetes Mother     Heart Disorder Mother     Hypertension Mother     Cancer Brother     Diabetes Son     Hypertension Son     Diabetes Sister     Hypertension Brother     Obesity Brother      REVIEW OF SYSTEMS:   GENERAL HEALTH: Overall feels well.    RESPIRATORY: Denies: YEE/MERCHATN/Cough  CARDIOVASCULAR: Denies CP/palpitations  VASCULAR: Denies LE edema  : denies urinary symptoms  NEURO: denies headaches/dizziness/fainting/weakness/change in vision  PSYCH: denies depression and anxiety    Immunization History   Administered Date(s) Administered    Covid-19 Vaccine Pfizer 30 mcg/0.3 ml 10/08/2021, 10/29/2021    Covid-19 Vaccine Pfizer Saurav-Sucrose 30 mcg/0.3 ml 03/30/2022    TDAP 08/02/2023       EXAM:   /88   Pulse 85   Temp 97.8 °F (36.6 °C) (Temporal)   Resp 21   Ht 5' 1.42\" (1.56 m)   Wt 197 lb (89.4 kg)   LMP  (LMP Unknown)   SpO2 98%   BMI 36.72 kg/m²   GENERAL: NAD, pleasant overweight -American female  SKIN: no visible rashes  HEAD: NCAT  VASCULAR: No lower extremity edema  EXTREMITIES: no cyanosis or clubbing  NEURO: Alert and Oriented x3.  No gross motor or gross sensory abnormalities.  PSYCH: Affect normal.  Normal thought content.          ASSESSMENT AND PLAN:       Encounter Diagnoses   Name Primary?    Primary hypertension Yes    Encounter for new medication prescription        1.  Primary hypertension  Needs improvement.  Recommend add metoprolol succinate one half tab of a 25 mg tab nightly for 1 to 2 weeks then increase to 1 full tab nightly if tolerating.  Please note patient has had side effects to amlodipine, lisinopril, and losartan, that is why we will have patient try a beta-blocker.  Continue with low-sodium diet.  Recommend increase exercise, patient recently started working on exercise again.  Recommend continue hydrochlorothiazide 25 mg p.o. every morning.  Patient to keep follow-up appointment that she has scheduled for 11/8/2024.    - metoprolol succinate ER 25 MG Oral Tablet 24 Hr; Start 1/2 tab nightly for 1-2 weeks, then if tolerating increase to one full tab nightly  Dispense: 30 tablet; Refill: 1    2. Encounter for new medication prescription  Medication use, risks, benefits, side effects and precautions discussed, patient verbalizes understanding. Questions encouraged and answered to patient's satisfaction.    - metoprolol succinate ER 25 MG Oral Tablet 24 Hr; Start 1/2 tab nightly for 1-2 weeks, then if tolerating increase to one full tab nightly  Dispense: 30 tablet; Refill: 1            Meds & Refills for this Visit:  Requested Prescriptions     Signed Prescriptions Disp Refills    metoprolol succinate ER 25 MG Oral Tablet 24 Hr 30 tablet 1     Sig: Start 1/2 tab nightly for 1-2 weeks, then if tolerating increase to one full tab nightly         Return in about 15 days (around 11/8/2024) for Hypertension.

## 2024-10-29 ENCOUNTER — HOSPITAL ENCOUNTER (OUTPATIENT)
Dept: BONE DENSITY | Age: 64
Discharge: HOME | End: 2024-10-29
Attending: FAMILY MEDICINE
Payer: MEDICAID

## 2024-10-29 ENCOUNTER — HOSPITAL ENCOUNTER (OUTPATIENT)
Dept: MAMMOGRAPHY | Age: 64
Discharge: HOME | End: 2024-10-29
Attending: FAMILY MEDICINE
Payer: MEDICAID

## 2024-10-29 DIAGNOSIS — M85.89 OSTEOPENIA OF MULTIPLE SITES: ICD-10-CM

## 2024-10-29 DIAGNOSIS — Z12.31 ENCOUNTER FOR SCREENING MAMMOGRAM FOR MALIGNANT NEOPLASM OF BREAST: ICD-10-CM

## 2024-10-29 DIAGNOSIS — Z78.0 POSTMENOPAUSAL: ICD-10-CM

## 2024-10-29 PROCEDURE — 77067 SCR MAMMO BI INCL CAD: CPT | Performed by: FAMILY MEDICINE

## 2024-10-29 PROCEDURE — 77063 BREAST TOMOSYNTHESIS BI: CPT | Performed by: FAMILY MEDICINE

## 2024-10-29 PROCEDURE — 77080 DXA BONE DENSITY AXIAL: CPT | Performed by: FAMILY MEDICINE

## 2024-10-30 ENCOUNTER — TELEPHONE (OUTPATIENT)
Dept: FAMILY MEDICINE CLINIC | Facility: CLINIC | Age: 64
End: 2024-10-30

## 2024-10-30 DIAGNOSIS — R92.333 HETEROGENEOUSLY DENSE TISSUE OF BOTH BREASTS ON MAMMOGRAPHY: Primary | ICD-10-CM

## 2024-10-30 NOTE — TELEPHONE ENCOUNTER
Screening mammogram report reviewed and the below was cut and pasted from the report dated 10/29/2024:  Recommend additional imaging due to dense breast tissue, ultrasound has been ordered, please have patient schedule.    A results message has been sent to patient regarding bone density.    Encounter Diagnosis   Name Primary?    Heterogeneously dense tissue of both breasts on mammography Yes         Imaging & Consults:  US BREAST BILATERAL COMPLETE (CPT=76641-50)        Impression  CONCLUSION:  Stable mammogram     BI-RADS CATEGORY:    DIAGNOSTIC CATEGORY 2 - BENIGN FINDING:       RECOMMENDATIONS:    ROUTINE MAMMOGRAM AND CLINICAL EVALUATION IN 12 MONTHS.       Because of breast density, this patient may benefit from supplemental screening with breast MRI, Molecular Breast Imaging (MBI) or bilateral whole breast ultrasound for increased sensitivity for detection of cancer which can be obscured mammographically.    Please contact your ordering provider to discuss supplemental screening options.

## 2024-10-30 NOTE — TELEPHONE ENCOUNTER
Spoke with patient, if her imaging is ok, she will be fine with an appointment in December. Patient is just concerned and would like results. Patient stated that she received a call yesterday that one of her nephews passed away. She would just like to know if she is good and she understands that her appointment 11/8 needs to be rescheduled. Informed patient that a message was sent to Dr Zavala for her to view her test results and that once she has, the patient will hear from either Dr Zavala or myself. Patient voiced understanding.

## 2024-10-30 NOTE — TELEPHONE ENCOUNTER
Patient was called to reschedule appointment 11/8/2024. Patient declined to make follow up appointment in December. Patient would like to know the results of mammogram and bone density. Patient needs an appointment in November.

## 2024-11-21 ENCOUNTER — OFFICE VISIT (OUTPATIENT)
Dept: FAMILY MEDICINE CLINIC | Facility: CLINIC | Age: 64
End: 2024-11-21
Payer: MEDICAID

## 2024-11-21 VITALS
TEMPERATURE: 98 F | OXYGEN SATURATION: 98 % | BODY MASS INDEX: 36.72 KG/M2 | RESPIRATION RATE: 21 BRPM | HEART RATE: 71 BPM | DIASTOLIC BLOOD PRESSURE: 72 MMHG | WEIGHT: 197 LBS | HEIGHT: 61.42 IN | SYSTOLIC BLOOD PRESSURE: 128 MMHG

## 2024-11-21 DIAGNOSIS — Z79.899 ENCOUNTER FOR LONG-TERM CURRENT USE OF MEDICATION: ICD-10-CM

## 2024-11-21 DIAGNOSIS — G89.29 CHRONIC LEFT HIP PAIN: ICD-10-CM

## 2024-11-21 DIAGNOSIS — I10 PRIMARY HYPERTENSION: Primary | ICD-10-CM

## 2024-11-21 DIAGNOSIS — M25.552 CHRONIC LEFT HIP PAIN: ICD-10-CM

## 2024-11-21 DIAGNOSIS — M85.89 OSTEOPENIA OF MULTIPLE SITES: ICD-10-CM

## 2024-11-21 DIAGNOSIS — R26.2 DIFFICULTY WALKING: ICD-10-CM

## 2024-11-21 PROCEDURE — 99214 OFFICE O/P EST MOD 30 MIN: CPT | Performed by: FAMILY MEDICINE

## 2024-11-21 RX ORDER — HYDROCHLOROTHIAZIDE 25 MG/1
25 TABLET ORAL EVERY MORNING
Qty: 90 TABLET | Refills: 1 | Status: SHIPPED | OUTPATIENT
Start: 2024-11-21

## 2024-11-21 RX ORDER — METOPROLOL SUCCINATE 25 MG/1
25 TABLET, EXTENDED RELEASE ORAL NIGHTLY
Qty: 90 TABLET | Refills: 1 | Status: SHIPPED | OUTPATIENT
Start: 2024-11-21

## 2024-11-21 NOTE — PATIENT INSTRUCTIONS
-Taking over-the-counter calcium supplement 500 to 600 mg twice a day with food, take this in addition to your vitamin D supplement.        
Constitutional:  See HPI  Eyes:  No visual changes  ENMT: No neck pain or stiffness  Cardiac:  No chest pain  Respiratory:  No cough or respiratory distress.   GI:  No nausea, vomiting, diarrhea or abdominal pain.  :  No dysuria, frequency or burning.  MS:  See HPI  Neuro:  No headache   Skin:  No skin rash  Except as documented in the HPI,  all other systems are negative

## 2024-11-21 NOTE — PROGRESS NOTES
Laine Godinez is a 64 year old female.     Chief Complaint   Patient presents with    Hypertension    Hip Pain    Other     [place]         HPI:     Hypertension:  Improved..   Hypertension improved with addition of metoprolol succinate to hydrochlorothiazide.  Pt has been taking medications as instructed, no medication side effects.     Chronic left hip pain:  Complains of difficulty walking.  Normally uses a cane to assist with ambulation, however patient lost her cane and needs a new one.      Osteopenia of multiple sites:  Currently taking a vitamin D supplement but not taking a calcium supplement.        Wt Readings from Last 6 Encounters:   11/21/24 197 lb (89.4 kg)   10/24/24 197 lb (89.4 kg)   09/27/24 197 lb (89.4 kg)   05/30/24 192 lb (87.1 kg)   04/26/24 190 lb (86.2 kg)   03/15/24 192 lb (87.1 kg)      Body mass index is 36.72 kg/m².        Current Outpatient Medications   Medication Sig Dispense Refill    metoprolol succinate ER 25 MG Oral Tablet 24 Hr Take 1 tablet (25 mg total) by mouth at bedtime. 90 tablet 1    hydroCHLOROthiazide 25 MG Oral Tab Take 1 tablet (25 mg total) by mouth every morning. 90 tablet 1    ipratropium-albuterol (COMBIVENT RESPIMAT)  MCG/ACT Inhalation Aero Soln Inhale 1 puff into the lungs every 4 (four) hours as needed. 12 g 1    cholecalciferol (VITAMIN D3) 125 MCG (5000 UT) Oral Cap Take 1 capsule (5,000 Units total) by mouth daily.        Past Medical History:    Acute mastitis of left breast    Arthritis    Very painful most days.    Cervicalgia    Class 2 severe obesity due to excess calories with serious comorbidity and body mass index (BMI) of 36.0 to 36.9 in adult (HCC)    Associated with hypertension and hypercholesterolemia    Current smoker    Diffusion capacity of lung (dl), decreased    severe     Essential hypertension    Fibroadenosis of breast    Hematochezia    High blood pressure    History of fusion of cervical spine    History of hernia repair     History of umbilical hernia repair    Hyperlipidemia    Hypertension goal BP (blood pressure) < 130/80    Lung nodule    Medication side effect    possibly from Celebrex, see above     Osteoarthritis of right hip    Osteoarthrosis, unspecified whether generalized or localized, unspecified site    Osteopenia    Paresthesia and pain of both upper extremities    Problems with swallowing    Rectal bleeding    Renal cyst    Renal cyst, right    Upper to midpole 6.7cm on CT done at [facility] 2017     Right-sided chest wall pain    Likely Zoster    Shoulder pain, left    Shoulder pain, right    Solitary cyst of breast      Past Surgical History:   Procedure Laterality Date    Colonoscopy      Colonoscopy  2017    Hernia surgery  2013    Hip replacement surgery      Right hip total replacement    Hysterectomy      1985    Bud localization wire 1 site right (cpt=19281)      loc, , benign    Needle biopsy right            1974    Oophorectomy      1985    Other surgical history  2010    Neck hernated disc    Other surgical history Right 2023    Soft Tissue removal on the right arm    Spine surgery procedure unlisted  2011    \"neck surgery,\" [facility] [place], [place]    Tonsillectomy  1964    Total abdom hysterectomy  1985    Total hip replacement Left 2016    Dr. Greene at [facility]    Upper gi endoscopy performed  2017    Upper gi endoscopy,exam  2019    Dr. Jordon Arre[facility] at [facility]       Social History:    Social History     Socioeconomic History    Marital status: Single   Tobacco Use    Smoking status: Former     Current packs/day: 0.00     Average packs/day: 0.2 packs/day for 40.2 years (10.1 ttl pk-yrs)     Types: Cigarettes     Start date:      Quit date: 2024     Years since quittin.6    Smokeless tobacco: Never    Tobacco comments:     2 or 3 cigarettes per day   Vaping Use    Vaping status:  Never Used   Substance and Sexual Activity    Alcohol use: Yes     Comment: occasional Holidays    Drug use: Yes     Types: Cannabis     Comment: prn for pain - at night when she cannot sleep   Other Topics Concern    Blood Transfusions No    Caffeine Concern No    Sleep Concern No    Stress Concern No    Weight Concern Yes     Comment: Why weight go up an down,lose weight  gain it [facility] ....    Special Diet No    Exercise No    Seat Belt Yes         Family History   Problem Relation Age of Onset    Heart Attack Father     Heart Disorder Father     Pulmonary Disease Mother         COPD    Asthma Mother     Diabetes Mother     Heart Disorder Mother     Hypertension Mother     Cancer Brother     Diabetes Son     Hypertension Son     Diabetes Sister     Hypertension Brother     Obesity Brother      REVIEW OF SYSTEMS:   GENERAL HEALTH: Overall feels well.    RESPIRATORY: Denies: EYE/MERCHANT  CARDIOVASCULAR: Denies CP/palpitations  VASCULAR: Denies LE edema  Musculoskeletal: As in HPI  GI: Denies abdominal pain/nausea/vomiting/blood in stool/black stool/bloating/constipation/diarrhea  : denies urinary symptoms  NEURO: denies headaches/dizziness  PSYCH: denies depression and anxiety    Immunization History   Administered Date(s) Administered    Covid-19 Vaccine Pfizer 30 mcg/0.3 ml 10/08/2021, 10/29/2021    Covid-19 Vaccine Pfizer Saurav-Sucrose 30 mcg/0.3 ml 03/30/2022    TDAP 08/02/2023       EXAM:   /72   Pulse 71   Temp 97.6 °F (36.4 °C) (Temporal)   Resp 21   Ht 5' 1.42\" (1.56 m)   Wt 197 lb (89.4 kg)   LMP  (LMP Unknown)   SpO2 98%   BMI 36.72 kg/m²   GENERAL: NAD, pleasant obese -American female  SKIN: no visible rashes  HEAD: NCAT  LUNGS: CTA A/P no wheezes/ronchi/rales/crackles  CARDIO: RRR, +S1/S2, no mm  VASCULAR: No lower extremity edema  EXTREMITIES: no cyanosis or clubbing  NEURO: Alert and Oriented x3.   PSYCH: Affect normal.  Normal thought content.        DATA:    Interpretation    PROCEDURE:  [facility] MARIO 2D+3D SCREENING BILAT (CPT=77067/98456)     COMPARISON:  MG KAYLEIGH, UZMA MARIO 2D+3D SCREENING BILAT (CPT=77067/20583), 5/19/2021, 10:13 AM.  MG FELICIANO, UZMA MARIO 2D+3D DIAGNOSTIC ADDL VWS LEFT (WTY=12994/03638), 6/04/2021, 10:51 AM.  MG KAYLEIGH, UZMA MARIO 2D+3D SCREENING BILAT   (CPT=77067/02941), 9/15/2022, 3:45 PM.  MG KAYLEIGH, UZMA MARIO 2D+3D SCREENING BILAT (CPT=77067/67992), 10/05/2023, 10:32 AM.     INDICATIONS:  Z12.31 Encounter for screening mammogram for malignant neoplasm of breast     VIEWS OBTAINED:  Routine views of both breasts were obtained.    Standard 2D and additional multiplane thin sections of the breasts were obtained for the purpose of Tomosynthesis evaluation.  The images were reconstructed and reviewed on the dedicated Tomosynthesis workstation.     BREAST COMPOSITION:   Heterogeneously dense, which may obscure small masses.     FINDINGS: Stable nodular parenchymal pattern both breasts.  Scattered calcifications in both breasts.  No suspicious calcifications, spiculated masses or areas of architectural distortion in either breast.       =====  CONCLUSION:  Stable mammogram     BI-RADS CATEGORY:    DIAGNOSTIC CATEGORY 2 - BENIGN FINDING:       RECOMMENDATIONS:    ROUTINE MAMMOGRAM AND CLINICAL EVALUATION IN 12 MONTHS.       Because of breast density, this patient may benefit from supplemental screening with breast MRI, Molecular Breast Imaging (MBI) or bilateral whole breast ultrasound for increased sensitivity for detection of cancer which can be obscured mammographically.    Please contact your ordering provider to discuss supplemental screening options.    A letter explaining the results in lay terms has been sent to the patient.  This exam was evaluated with a computer-aided device.  This patient's information has been entered into a reminder system with a target due date for the next mammogram.    LOCATION:  [place]    Dictated by (CST): Nora Meier MD  on 10/29/2024 at 3:40 PM       Finalized by (CST): Nora Meier MD on 10/29/2024 at 3:41 PM              Interpretation   PROCEDURE:  XR DEXA BONE DENSITOMETRY (CPT=77080)     COMPARISON:  CAITLYN VICTOR, XR DEXA BONE DENSITOMETRY (CPT=77080), 5/19/2021, 10:08 AM.     INDICATIONS:  M85.89 Osteopenia of multiple sites Z78.0 Postmenopausal     PATIENT STATED HISTORY: (As transcribed by Technologist)  Dexa scan for bone density evaluation.           LUMBAR SPINE ANALYSIS RESULTS:      Bone mineral density (BMD) (g/cm2):  0.972    Lumbar T-Score:  -1.6      % young normals:  85      % age matched controls:  104      Change from prior spine examination:  -3.3%               TOTAL HIP ANALYSIS RESULTS:        Bone mineral density (BMD) (g/cm2):  0.811      Total Hip T-Score:  -1.4      % young normals:  79      % age matched controls:  92      Change from prior hip examination:  -3.9%               FEMORAL NECK ANALYSIS RESULTS:        Bone mineral density (BMD) (g/cm2):  0.786      Femoral neck T-Score:  -1.2      % young normals:  83      % age matched controls:  102      Change from prior hip examination:  -3.1%      10 Year Fracture Risk:     Major osteoporotic fracture:  5.1 %     Hip fracture:  0.2 %     Advise reference with published guidelines regarding criteria for pharmacological treatment to prevent osteoporosis, for example as published by the Bone Health and Osteoporosis Foundation guidelines, which on their site references this Consensus   Statement:  www.bit.ly/osteoguideline        ADDITIONAL FINDINGS:  No significant additional findings.       =====  CONCLUSION:  Osteopenia throughout     Recommendation:  The National Osteoporosis Foundation (NOF) recommends pharmacological treatment for patients with a FRAX 10-year risk score of 3% or higher for a hip fracture or 20% or higher for a major osteoporotic fracture, to prevent osteoporosis   and reduce fracture risk.     The World Health Organization has  defined the following categories based on bone density:  Normal bone:  T-score greater than or equal to -1  Osteopenia: T-score  less than -1 and greater  than -2.5  Osteoporosis:  T-score less than or equal -2.5        LOCATION:  [place]    Dictated by (CST): Mk Bella MD on 10/29/2024 at 2:00 PM       Finalized by (CST): Mk Bella MD on 10/29/2024 at 2:00 PM          ASSESSMENT AND PLAN:       Encounter Diagnoses   Name Primary?    Primary hypertension Yes    Encounter for long-term current use of medication     Chronic left hip pain     Difficulty walking     Osteopenia of multiple sites          1. Primary hypertension  Hypertension is well-controlled and improved since addition of metoprolol succinate 25 mg nightly to hydrochlorothiazide 25 mg daily.  Recommend continue metoprolol succinate and hydrochlorothiazide as below.  Follow-up on hypertension in 6 months, sooner if needed.    - metoprolol succinate ER 25 MG Oral Tablet 24 Hr; Take 1 tablet (25 mg total) by mouth at bedtime.  Dispense: 90 tablet; Refill: 1  - hydroCHLOROthiazide 25 MG Oral Tab; Take 1 tablet (25 mg total) by mouth every morning.  Dispense: 90 tablet; Refill: 1    2. Encounter for long-term current use of medication  Medication use, risks, benefits, side effects and precautions discussed, patient verbalizes understanding. Questions encouraged and answered to patient's satisfaction.    - metoprolol succinate ER 25 MG Oral Tablet 24 Hr; Take 1 tablet (25 mg total) by mouth at bedtime.  Dispense: 90 tablet; Refill: 1  - hydroCHLOROthiazide 25 MG Oral Tab; Take 1 tablet (25 mg total) by mouth every morning.  Dispense: 90 tablet; Refill: 1    3. Chronic left hip pain  4. Difficulty walking  Patient lost her cane and needs a new one.  DME order placed for cane to assist with ambulation, staff to fax order to [facility]'s.    - DME - External    5. Osteopenia of multiple sites  Patient counseled on vitamin D and calcium  supplements.  Recommend take over-the-counter calcium supplement 500 to 600 mg twice a day with food, take this in addition to your vitamin D supplement.  Patient counseled on recommendation of daily weightbearing exercises to help prevent progression to osteoporosis.        Meds & Refills for this Visit:  Requested Prescriptions     Signed Prescriptions Disp Refills    metoprolol succinate ER 25 MG Oral Tablet 24 Hr 90 tablet 1     Sig: Take 1 tablet (25 mg total) by mouth at bedtime.    hydroCHLOROthiazide 25 MG Oral Tab 90 tablet 1     Sig: Take 1 tablet (25 mg total) by mouth every morning.       Imaging & Consults:  DME - EXTERNAL     Return in about 6 months (around 5/21/2025) for Hypertension.  Sooner if needed.

## 2025-06-23 DIAGNOSIS — R05.3 CHRONIC COUGH: ICD-10-CM

## 2025-06-23 DIAGNOSIS — Z79.899 ENCOUNTER FOR LONG-TERM CURRENT USE OF MEDICATION: ICD-10-CM

## 2025-06-23 DIAGNOSIS — I10 PRIMARY HYPERTENSION: ICD-10-CM

## 2025-06-23 DIAGNOSIS — F17.210 CIGARETTE SMOKER: ICD-10-CM

## 2025-06-23 NOTE — TELEPHONE ENCOUNTER
Laine Godinez requesting medication refill for metoprolol succinate ER 25 MG Oral Tablet 24 Hr hydroCHLOROthiazide 25 MG Oral Tab ipratropium-albuterol (COMBIVENT RESPIMAT)  MCG/ACT Inhalation Aero Soln     LOV: 11/21/2024   Prescribed By: Dr. Patricia Zavala   Pharmacy Location: [facility] on file    Next Appointment: 7/9/2025 Patricia Zavala,       Informed patient to allow up to 24 to 48 business hours before checking with Pharmacy.

## 2025-06-24 RX ORDER — HYDROCHLOROTHIAZIDE 25 MG/1
25 TABLET ORAL EVERY MORNING
Qty: 30 TABLET | Refills: 0 | Status: SHIPPED | OUTPATIENT
Start: 2025-06-24 | End: 2025-06-30

## 2025-06-24 RX ORDER — METOPROLOL SUCCINATE 25 MG/1
25 TABLET, EXTENDED RELEASE ORAL NIGHTLY
Qty: 30 TABLET | Refills: 0 | Status: SHIPPED | OUTPATIENT
Start: 2025-06-24 | End: 2025-06-30

## 2025-06-24 RX ORDER — IPRATROPIUM BROMIDE AND ALBUTEROL 20; 100 UG/1; UG/1
1 SPRAY, METERED RESPIRATORY (INHALATION) EVERY 4 HOURS PRN
Qty: 12 G | Refills: 1 | Status: SHIPPED | OUTPATIENT
Start: 2025-06-24

## 2025-06-24 NOTE — TELEPHONE ENCOUNTER
Requested Prescriptions     Pending Prescriptions Disp Refills    ipratropium-albuterol (COMBIVENT RESPIMAT)  MCG/ACT Inhalation Aero Soln 12 g 1     Sig: Inhale 1 puff into the lungs every 4 (four) hours as needed.     Signed Prescriptions Disp Refills    hydroCHLOROthiazide 25 MG Oral Tab 30 tablet 0     Sig: Take 1 tablet (25 mg total) by mouth every morning.     Authorizing Provider: YARI CLAY     Ordering User: CHACORTA MORRIS    metoprolol succinate ER 25 MG Oral Tablet 24 Hr 30 tablet 0     Sig: Take 1 tablet (25 mg total) by mouth at bedtime.     Authorizing Provider: YARI CLAY     Ordering User: CHACORTA MORRIS       Last Refill: 9/4/24    Last OV: 11/21    Next OV: 7/9

## 2025-06-30 DIAGNOSIS — Z79.899 ENCOUNTER FOR LONG-TERM CURRENT USE OF MEDICATION: ICD-10-CM

## 2025-06-30 DIAGNOSIS — I10 PRIMARY HYPERTENSION: ICD-10-CM

## 2025-06-30 RX ORDER — METOPROLOL SUCCINATE 25 MG/1
25 TABLET, EXTENDED RELEASE ORAL NIGHTLY
Qty: 90 TABLET | Refills: 0 | Status: SHIPPED | OUTPATIENT
Start: 2025-06-30

## 2025-06-30 RX ORDER — HYDROCHLOROTHIAZIDE 25 MG/1
25 TABLET ORAL EVERY MORNING
Qty: 90 TABLET | Refills: 0 | Status: SHIPPED | OUTPATIENT
Start: 2025-06-30

## 2025-06-30 NOTE — TELEPHONE ENCOUNTER
Requested Prescriptions     Pending Prescriptions Disp Refills    hydroCHLOROthiazide 25 MG Oral Tab 30 tablet 0     Sig: Take 1 tablet (25 mg total) by mouth every morning.    metoprolol succinate ER 25 MG Oral Tablet 24 Hr 30 tablet 0     Sig: Take 1 tablet (25 mg total) by mouth at bedtime.     Patient requested 90 day supply of medication. 30 days were sent 6/24/25. Patient has appointment 7/9/25. Ok to send 90 days?

## 2025-06-30 NOTE — TELEPHONE ENCOUNTER
Laine Godinez requesting medication refill for hydroCHLOROthiazide 25 MG Oral Tab metoprolol succinate ER 25 MG Oral Tablet 24 Hr  - Patient requested 90 day refill and only receive 30 days     LOV: 11/21/2024   Prescribed By: Dr. Patricia Zavala   Pharmacy Location:[facility]     Next Appointment: 7/9/2025 Patricia Zavala,       Informed patient to allow up to 24 to 48 business hours before checking with Pharmacy.

## 2025-07-09 ENCOUNTER — OFFICE VISIT (OUTPATIENT)
Dept: FAMILY MEDICINE CLINIC | Facility: CLINIC | Age: 65
End: 2025-07-09
Payer: MEDICAID

## 2025-07-09 VITALS
TEMPERATURE: 97 F | BODY MASS INDEX: 37.09 KG/M2 | HEIGHT: 61.42 IN | RESPIRATION RATE: 20 BRPM | WEIGHT: 199 LBS | OXYGEN SATURATION: 96 % | SYSTOLIC BLOOD PRESSURE: 130 MMHG | DIASTOLIC BLOOD PRESSURE: 76 MMHG | HEART RATE: 83 BPM

## 2025-07-09 DIAGNOSIS — E78.00 HYPERCHOLESTEROLEMIA: ICD-10-CM

## 2025-07-09 DIAGNOSIS — E87.6 HYPOKALEMIA: ICD-10-CM

## 2025-07-09 DIAGNOSIS — Z79.899 ENCOUNTER FOR LONG-TERM CURRENT USE OF MEDICATION: ICD-10-CM

## 2025-07-09 DIAGNOSIS — I10 PRIMARY HYPERTENSION: Primary | ICD-10-CM

## 2025-07-09 PROCEDURE — 99214 OFFICE O/P EST MOD 30 MIN: CPT | Performed by: FAMILY MEDICINE

## 2025-07-09 RX ORDER — METOPROLOL SUCCINATE 25 MG/1
25 TABLET, EXTENDED RELEASE ORAL NIGHTLY
Qty: 90 TABLET | Refills: 1 | Status: SHIPPED | OUTPATIENT
Start: 2025-07-09

## 2025-07-09 RX ORDER — HYDROCHLOROTHIAZIDE 25 MG/1
25 TABLET ORAL EVERY MORNING
Qty: 90 TABLET | Refills: 1 | Status: SHIPPED | OUTPATIENT
Start: 2025-07-09

## 2025-07-09 NOTE — PROGRESS NOTES
Laine Godinez is a 64 year old female.     Chief Complaint   Patient presents with    Hypertension    Hyperlipidemia    Other     Hypokalemia         HPI:     Hypertension:  Stable.   Severity is Mild, pt is on 2 agents for her hypertension.  Pt has been taking medications as instructed, no medication side effects.   Home BP monitoring in the range of 120-130 systolic and 68-70s diastolic.  Diet: follows a low salt diet               Exercise: none    Had sleep disturbance a few months ago but back to her normal pattern.      Wt Readings from Last 6 Encounters:   07/09/25 199 lb (90.3 kg)   11/21/24 197 lb (89.4 kg)   10/24/24 197 lb (89.4 kg)   09/27/24 197 lb (89.4 kg)   05/30/24 192 lb (87.1 kg)   04/26/24 190 lb (86.2 kg)      Body mass index is 37.09 kg/m².        Current Medications[1]   Past Medical History[2]   Past Surgical History[3]   Social History:    Short Social Hx on File[4]    Family History[5]  REVIEW OF SYSTEMS:   GENERAL HEALTH: Overall feels well.    INTEGUMENT: denies any unusual skin lesions or rashes   RESPIRATORY: Denies: YEE/MERCHANT  CARDIOVASCULAR: Denies CP/palpitations  VASCULAR: Denies LE edema, denies claudication type symptoms  GI: Denies abdominal pain/nausea/vomiting/diarrhea  : denies urinary symptoms  NEURO: denies headaches/dizziness  PSYCH: denies depression and anxiety    Immunization History   Administered Date(s) Administered    Covid-19 Vaccine Pfizer 30 mcg/0.3 ml 10/08/2021, 10/29/2021    Covid-19 Vaccine Pfizer Saurav-Sucrose 30 mcg/0.3 ml 03/30/2022    TDAP 08/02/2023       EXAM:   /76   Pulse 83   Temp 97.3 °F (36.3 °C) (Temporal)   Resp 20   Ht 5' 1.42\" (1.56 m)   Wt 199 lb (90.3 kg)   LMP  (LMP Unknown)   SpO2 96%   BMI 37.09 kg/m²   GENERAL: NAD, pleasant obese  female.  INTEGUMENT: no visible rashes  HEAD: NCAT  LUNGS: CTA A/P no wheezes/ronchi/rales/crackles  CARDIO: RRR, +S1/S2, no mm  VASCULAR:  No lower extremity edema  EXTREMITIES:  no cyanosis or clubbing  NEURO: Alert and Oriented x3.  No gross motor or gross sensory abnormalities.  PSYCH: Affect normal.  Normal thought content.        DATA:    Component      Latest Ref Rng 5/24/2022 6/2/2023 10/4/2024   Glucose      70 - 99 mg/dL 109 (H)  114 (H)  117 (H)    Sodium      136 - 145 mmol/L 141  135 (L)  139    Potassium      3.5 - 5.1 mmol/L 3.7  3.0 (L)  3.2 (L)    Chloride      98 - 112 mmol/L 101  101  104    Carbon Dioxide, Total      21.0 - 32.0 mmol/L 29  30.0  29.0    ANION GAP      0 - 18 mmol/L  4  6    BUN      9 - 23 mg/dL 7  9  9    CREATININE      0.55 - 1.02 mg/dL 0.54  0.66  0.63    CALCIUM      8.7 - 10.4 mg/dL 9.8  9.1  9.9    CALCULATED OSMOLALITY      275 - 295 mOsm/kg  280  288    EGFR      >=60 mL/min/1.73m2  99  99    AST (SGOT)      <34 U/L 17  20  18    ALT (SGPT)      10 - 49 U/L 11  23  13    ALKALINE PHOSPHATASE      50 - 130 U/L  55  56    Total Bilirubin      0.2 - 1.1 mg/dL 0.7  0.8  0.7    PROTEIN, TOTAL      5.7 - 8.2 g/dL 7.5  7.7  7.4    Albumin      3.2 - 4.8 g/dL 4.7  4.0  4.5    Globulin      2.0 - 3.5 g/dL 2.8  3.7  2.9    A/G Ratio      1.0 - 2.0  1.7  1.1  1.6    Patient Fasting for CMP?  Yes  Yes    eGFR NON-AFR. AMERICAN      > OR = 60 mL/min/1.73m2 101      eGFR       > OR = 60 mL/min/1.73m2 117      BUN/CREATININE RATIO      6 - 22 (calc) NOT APPLICABLE      Alkaline Phosphatase      37 - 153 U/L 56      Magnesium, Serum      1.6 - 2.6 mg/dL 2.0   1.9             ASSESSMENT AND PLAN:       Encounter Diagnoses   Name Primary?    Primary hypertension Yes    Encounter for long-term current use of medication     Hypercholesterolemia     Hypokalemia        1. Primary hypertension  Hypertension is well-controlled.  Recommend continue hydrochlorothiazide and metoprolol as below.  Follow-up on hypertension in 6 to 9 months.    - hydroCHLOROthiazide 25 MG Oral Tab; Take 1 tablet (25 mg total) by mouth every morning.  Dispense: 90 tablet;  Refill: 1  - metoprolol succinate ER 25 MG Oral Tablet 24 Hr; Take 1 tablet (25 mg total) by mouth at bedtime.  Dispense: 90 tablet; Refill: 1    - CBC With Differential With Platelet; Future  - Comp Metabolic Panel (14); Future  - Lipid Panel; Future  - Assay, Thyroid Stim Hormone; Future  - Free T4, (Free Thyroxine); Future    2. Encounter for long-term current use of medication  Medication use, risks, benefits, side effects and precautions discussed, patient verbalizes understanding. Questions encouraged and answered to patient's satisfaction.    - hydroCHLOROthiazide 25 MG Oral Tab; Take 1 tablet (25 mg total) by mouth every morning.  Dispense: 90 tablet; Refill: 1  - metoprolol succinate ER 25 MG Oral Tablet 24 Hr; Take 1 tablet (25 mg total) by mouth at bedtime.  Dispense: 90 tablet; Refill: 1    3. Hypercholesterolemia  Reevaluate lipid panel, further recommendations pending results.    - Lipid Panel; Future    4. Hypokalemia  Likely secondary to use of hydrochlorothiazide.  Reevaluate potassium level and magnesium level.  Consider potassium supplement in the future versus discontinuation of hydrochlorothiazide and switching to an ACE inhibitor.    - Magnesium [E]; Future          Orders Placed This Encounter   Procedures    Magnesium [E]    CBC With Differential With Platelet    Comp Metabolic Panel (14)    Lipid Panel    Assay, Thyroid Stim Hormone    Free T4, (Free Thyroxine)       Meds & Refills for this Visit:  Requested Prescriptions     Signed Prescriptions Disp Refills    hydroCHLOROthiazide 25 MG Oral Tab 90 tablet 1     Sig: Take 1 tablet (25 mg total) by mouth every morning.    metoprolol succinate ER 25 MG Oral Tablet 24 Hr 90 tablet 1     Sig: Take 1 tablet (25 mg total) by mouth at bedtime.         Return in about 3 months (around 10/9/2025) for Annual Wellness Visit and as needed or indicated.           [1]   Current Outpatient Medications   Medication Sig Dispense Refill    hydroCHLOROthiazide  25 MG Oral Tab Take 1 tablet (25 mg total) by mouth every morning. 90 tablet 1    metoprolol succinate ER 25 MG Oral Tablet 24 Hr Take 1 tablet (25 mg total) by mouth at bedtime. 90 tablet 1    ipratropium-albuterol (COMBIVENT RESPIMAT)  MCG/ACT Inhalation Aero Soln Inhale 1 puff into the lungs every 4 (four) hours as needed. 12 g 1    cholecalciferol (VITAMIN D3) 125 MCG (5000 UT) Oral Cap Take 1 capsule (5,000 Units total) by mouth daily.     [2]   Past Medical History:   Acute mastitis of left breast    Arthritis    Very painful most days.    Cervicalgia    Class 2 severe obesity due to excess calories with serious comorbidity and body mass index (BMI) of 36.0 to 36.9 in adult (HCC)    Associated with hypertension and hypercholesterolemia    Current smoker    Diffusion capacity of lung (dl), decreased    severe     Essential hypertension    Fibroadenosis of breast    Hematochezia    High blood pressure    History of fusion of cervical spine    History of hernia repair    History of umbilical hernia repair    Hyperlipidemia    Hypertension goal BP (blood pressure) < 130/80    Lung nodule    Medication side effect    possibly from Celebrex, see above     Osteoarthritis of right hip    Osteoarthrosis, unspecified whether generalized or localized, unspecified site    Osteopenia    Paresthesia and pain of both upper extremities    Problems with swallowing    Rectal bleeding    Renal cyst    Renal cyst, right    Upper to midpole 6.7cm on CT done at [facility] 1/25/2017     Right-sided chest wall pain    Likely Zoster    Shoulder pain, left    Shoulder pain, right    Solitary cyst of breast   [3]   Past Surgical History:  Procedure Laterality Date    Colonoscopy  2011    Colonoscopy  04/25/2017    Hernia surgery  06/2013    Hip replacement surgery  2015    Right hip total replacement    Hysterectomy      1985    Bud localization wire 1 site right (cpt=19281)      loc, 2012, benign    Needle  biopsy right            1974    Oophorectomy      1985    Other surgical history  2010    Neck hernated disc    Other surgical history Right 2023    Soft Tissue removal on the right arm    Spine surgery procedure unlisted  2011    \"neck surgery,\" [facility], [place], [place]    Tonsillectomy  1964    Total abdom hysterectomy  1985    Total hip replacement Left 2016    Dr. Greene at [facility]    Upper gi endoscopy performed  2017    Upper gi endoscopy,exam  2019    Dr. Jordon Arreola at [facility]    [4]   Social History  Socioeconomic History    Marital status: Single   Tobacco Use    Smoking status: Former     Current packs/day: 0.00     Average packs/day: 0.2 packs/day for 40.2 years (10.1 ttl pk-yrs)     Types: Cigarettes     Start date:      Quit date: 2024     Years since quittin.2    Smokeless tobacco: Never    Tobacco comments:     2 or 3 cigarettes per day   Vaping Use    Vaping status: Never Used   Substance and Sexual Activity    Alcohol use: Yes     Comment: occasional Holidays    Drug use: Yes     Types: Cannabis     Comment: prn for pain - at night when she cannot sleep   Other Topics Concern    Blood Transfusions No    Caffeine Concern No    Sleep Concern No    Stress Concern No    Weight Concern Yes     Comment: Why weight go up an down,lose weight  gain it [facility] ....    Special Diet No    Exercise No    Seat Belt Yes   [5]   Family History  Problem Relation Age of Onset    Heart Attack Father     Heart Disorder Father     Pulmonary Disease Mother         COPD    Asthma Mother     Diabetes Mother     Heart Disorder Mother     Hypertension Mother     Cancer Brother     Diabetes Son     Hypertension Son     Diabetes Sister     Hypertension Brother     Obesity Brother

## (undated) DIAGNOSIS — I10 ESSENTIAL HYPERTENSION, BENIGN: ICD-10-CM

## (undated) DEVICE — SUT MONOCRYL 4-0 P-3 Y494G

## (undated) DEVICE — SLEEVE KENDALL SCD EXPRESS MED

## (undated) DEVICE — STERILE POLYISOPRENE POWDER-FREE SURGICAL GLOVES: Brand: PROTEXIS

## (undated) DEVICE — GLOVE SURG SENSICARE SZ 6-1/2

## (undated) DEVICE — SOL NACL IRRIG 0.9% 1000ML BTL

## (undated) DEVICE — UPPER EXTREMITY CDS-LF: Brand: MEDLINE INDUSTRIES, INC.

## (undated) DEVICE — REMOVER DURAPREP 3M

## (undated) DEVICE — GAUZE STERILE 4X4 12PLY

## (undated) DEVICE — DERMABOND CLOSURE 0.7ML TOPICL

## (undated) DEVICE — BANDAID COVERLET 1X3

## (undated) DEVICE — YANKAUER,FLEXIBLE HANDLE,FINE CAPACITY: Brand: MEDLINE

## (undated) DEVICE — GLOVE SURG SENSICARE SZ 7-1/2

## (undated) DEVICE — SUT VICRYL 3-0 SH J416H

## (undated) DEVICE — STERILE POLYISOPRENE POWDER-FREE SURGICAL GLOVES WITH EMOLLIENT COATING: Brand: PROTEXIS

## (undated) DEVICE — PAIN TRAY: Brand: MEDLINE INDUSTRIES, INC.

## (undated) NOTE — LETTER
Date: 4/26/2019    Patient Name: Mark Benites          To Whom it may concern: The above patient was seen at the [facility] for treatment of a medical condition.     Please provide this patient with a higher toilet, she has a medical c

## (undated) NOTE — LETTER
[facility]     [] NEW APPLICANT  [address] [place] [address]  [x] RENEWAL            Persons with Disabilities Certification for Parking Placard  *This form is valid for three months from your physician's signature date for a Temporary Placard and six months for a Permanent Placard.    NOTE TO ALL DISABILITY LICENSE PLATE OWNERS:  If you have a disability license plate, you MUST complete the form and renew your placard.    DIRECTIONS: Both sides of this document must be signed and completed fully. All fields are required.   Applicants complete Part 1. If the applicant is a MINOR, then Parent/Guardian(s) MUST also complete Part 2. The applicant's medical professional MUSTcomplete Part 3. If the applicant is applying for meter-exempt parking, his/her medical professional MUST also complete Part 4.    PART 1:   Applicant Information (MUST have a valid [place] 's license and/or ID card)  I hereby certify  that I meet the definition of a person with a disability as provided in [facility] \[facility]\"" 5/1-159.1, and I certify  that my physical condition entitles me to the issuance of a Persons with Disabilities Parking Placard. By affixing my signature below, I understand that the parking placard may not be used unless I am the  or passenger of the vehicle.     *If a  , please provide a copy of your  showing proof of service.     Disability Parking Placard # (if any)     Full Name of Person with Disability (If minor, complete Part 2 also)    Laine Godinez  Male/Female  female  Date of Birth   11/3/1960    Valid [place] ’s License or ID Card # of Applicant                                                                                                  [place] Address  [address] [place] [id number]    Mailing Address if Different from Above   Telephone Number  [phone] (home)  Email Address   [email or web address]  [place]? Yes/No  No      Signature of Person with Disability Today’s Date  9/27/2024     PART 2:   For Parent or Legal Guardian (MUST have a valid 's license and/or ID card)  I hereby certify that the above applicant is a minor and I have primary responsibility for his/her transportation. By affixing my signature below, I understand that the disability placard is issued to the person with the disability and may not be used unless I am transporting the disabled person in the vehicle.     Name of Parent or Legal Guardian   Relationship to Person with Disability   Valid [place] ’s License or ID Card #          [place] Address Apt/Unit# City State Zip   Telephone Number Email Address   Signature of Parent or Legal Guardian Today’s Date  9/27/2024     Warning: Any misuse of the disability parking placard/plates or making a false application may result in the revocation of the placard, a 12-month suspension or revocation of your drivers license, and a fine of up to $1,000.    Temporary Disabled Parking Placard Applications -- May be taken to any [facility] facility or mailed in.  Permanent Disables Parking Placard Applications -- MUST be mailed to the following address:  , Persons with Disabilities Placard Unit, [address] [place] [address], [address], [place] [address].    *If you have a permanent disability placard and would like a Persons with Disabilities License Plate, please visit your local  facility to apply. You will need your permanent placard number and current plate number or KARINA.     Please complete Page 2 to ensure timely processing.    Printed by authority of the [facility]. July 2021 -- 1 -- VSD 62.28          Part 3: Medical Eligibility Standards and Medical Professionals Certification  As the medical professional(s) executing this document and verifying the nature of the applicant's disability, I understand that making a false representation of a person's disability for  the purposes of obtaining any type of a disabled parking placard may result in suspension or revocation of my license and a fine of up to $1,000. As a licensed physician, advanced practice nurse, optometrist, chiropractor or physician's assistant, I certify the applicant has a condition that constitutes him/her as a person with disabilities.   Length of Disability: (Check one)   []   Temporary Disability; the duration of this disability is _____________________ (maximum 6 months)  [x]   Permanent Disability  []   Meter-Exempt Disability (Must complete and sign Part 4 also.)  Check all that apply (MUST check at least one):  []  Is restricted by a lung disease to such a degree that the person’s forced (respiratory) expiratory volume (FEV) for 1 second, when measured by spirometry, is less than 1 liter.  []   Uses a portable oxygen device.  []   Has a Class III or Class IV cardiac condition according to the standards set by the American Heart Association.  []   Cannot walk without the use of or assistance from a wheelchair, a walker, a crutch, a brace, a prosthetic device or another person.  [x]   Is severely limited in the ability to walk due to an arthritic, neurological, oncological or orthopedic condition.  [x]   Cannot walk 200 feet without stopping to rest because of one of the above five conditions.  Check all that apply: (MUST check at least one diagnosis):  []Amputation of extremity(s)_________________ [] Arthritis of the ______________________  []Spina Bifida     []Osteoarthritis of the __________________   []Multiple Sclerosis    [] Chronic Pain due to ___________________  []Quadriplegia/Paraplegia   [] Legally Blind with limited mobility  [] Cerebral Palsy  [x] Other Diagnosis: _____Chronic right hip pain. History of right hip replacement____________________________________________________________    If none of the above conditions apply, list the medical condition that impacts the person’s mobility.      Medical Professional’s Printed Name*   Patricia Zavala DO Specialty  Family Medicine   Office Address   [facility], [facility], [place]  [address]   [facility] [address]  Dept: [phone]  Dept Fax: [phone]   Medical Professional’s Signature   State Professional License Number (NOT NPI#)   [id number] Today’s Date                  9/27/2024    Signature of Collaborating/Supervising Physician (if signed above by resident/assistant) Supervising State Professional License Number             PART 4: Medical Eligibility for Meter-Exempt Parking  The meter-exempt parking certification must be completed only when the applicant qualifies. To qualify, the applicant MUST have a VALID  [place] 's license, have an ambulatory disability described in Part 3, and also have one of the following conditions listed below.  Economic need is not a consideration for meter-exempt parking    The applicant is eligible for meter-exempt parking as provided by statue due to the following PERMANENT medical condition or disability:    Check all that apply:  [] Cannot manage, manipulate, or insert coins, or obtain tickets in parking meters/ticket machines due to lack of fine motor control of BOTH hands.  [] Cannot reach above his/her head to a height of 42 inches from the ground due to a lack of finger, hand or upper-extremity strength or mobility.  [] Cannot approach a parking meter due to his/her use of a wheelchair or other device for mobility.  [] Cannot walk more than 20 feet due to an orthopedic, neurological, cardiovascular, or lung condition in which the degree of debilitation is so severe that it almost completely impedes the ability to walk.  [] Missing a hand(s) or arm(s) or has permanently lost the use of a hand or arm.  [] Patient is under 18 years of age and incapable of driving.     Medical Professional’s Signature State Professional License Number (NOT NPI#)   Today’s Date                   9/27/2024    Signature of Collaborating/Supervising Physician (if signed above by resident/assistant) Supervising State Professional License Number     FOR  OFFICE USE ONLY     Parking Placard Number:  Expiration Date:   Issued By: Issued Date:

## (undated) NOTE — MR AVS SNAPSHOT
[facility] Del ToroPeter velasquez [facility]  [phone]               Thank you for choosing us for your health care visit with Paula Gold DO.   We are glad to serve you and happy to provide you with this s will give you more information as needed. Treatment may include some of the same home care measures listed below.)  Mastitis may cause flu-like symptoms such as fever, aches, and fatigue.  The affected breast may feel painful, warm, tender, firm, or swollen often as directed by your provider. ¨ Heat: Place a warm compress on the breast. Use a towel soaked in hot water, a heating pad, or a hot water bottle. ¨ Cold: Place a cold compress on the breast. Use an ice pack or bag of ice wrapped in a thin towel.  Ne Where to Get Your Medications      These medications were sent to [address], [address] [facility]  [address] [place], [phone]  [address] [place] [address],Third Floor, [place] [address]     Phone:  [facility]

## (undated) NOTE — LETTER
19      Re: Elsi HAWLEY 11/3/1960    Attn: Klaus Zuniga  [phone]      Due to patient's left hip condition she needs to have a higher toilet to accommodate her physical needs.     If there are any questions please contact my office at

## (undated) NOTE — MR AVS SNAPSHOT
[facility]ish Del [facility] velasquez [facility]  [phone]               Thank you for choosing us for your health care visit with Paula Gold DO.   We are glad to serve you and happy to provide you with this s (354.0)                   Today's Orders     Urine Dip, auto without Micro    Complete by:  As directed    Assoc Dx:  Right lower quadrant abdominal pain [R10.31], Abdominal wall pain in suprapubic region [R10.30]           CT ABDOMEN+PELVIS(CONTRAST ONLY) [email or web address]. If you've recently had a stay at the Hospital you can access your discharge instructions in [facility] by going to Visits < Admission Summaries.  If you've been to the Emergency Department or your doctor's office, you can view yo Visit [facility] online at  [email or web address]

## (undated) NOTE — LETTER
[facility]     [] NEW APPLICANT  [address]   *If your valid placard was lost/stolen/damaged, use replacement form , available online at [employer] or visit your [place] [address]  [x] RENEWAL            Persons with Disabilities Certification for Parking Placard  *This form is valid for three months from your physician's signature date for a Temporary Placard and six months for a Permanent Placard.    NOTE TO ALL DISABILITY LICENSE PLATE OWNERS:  If you have a disability license plate, you MUST complete the form and renew your placard.    DIRECTIONS: Both sides of this document must be signed and completed fully. All fields are required.   Applicants complete Part 1. If the applicant is a MINOR, then Parent/Guardian(s) MUST also complete Part 2. The applicant's medical professional MUSTcomplete Part 3. If the applicant is applying for meter-exempt parking, his/her medical professional MUST also complete Part 4.    PART 1:   Applicant Information (MUST have a valid [place] 's license and/or ID card)  I hereby certify  that I meet the definition of a person with a disability as provided in [address] [place] [address] 5/1-159.1, and I certify  that my physical condition entitles me to the issuance of a Persons with Disabilities Parking Placard. By affixing my signature below, I understand that the parking placard may not be used unless I am the  or passenger of the vehicle.     *If a  , please provide a copy of your  showing proof of service.     Disability Parking Placard # (if any)     Full Name of Person with Disability (If minor, complete Part 2 also)    Laine Godinez  Male/Female  female  Date of Birth   11/3/1960    Valid [place] ’s License or ID Card # of Applicant                                                                                                  [place] Address  [address] [place] [address]  [place] [address]    Mailing Address if Different from Above   Telephone Number  [phone] (home)  Email Address   [email or web address]  [place]? Yes/No  No     Signature of Person with Disability Today’s Date  3/15/2024     PART 2:   For Parent or Legal Guardian (MUST have a valid 's license and/or ID card)  I hereby certify that the above applicant is a minor and I have primary responsibility for his/her transportation. By affixing my signature below, I understand that the disability placard is issued to the person with the disability and may not be used unless I am transporting the disabled person in the vehicle.     Name of Parent or Legal Guardian   Relationship to Person with Disability   Valid [place] ’s License or ID Card #          [place] Address Apt/Unit# City State Zip   Telephone Number Email Address   Signature of Parent or Legal Guardian Today’s Date  3/15/2024     Warning: Any misuse of the disability parking placard/plates or making a false application may result in the revocation of the placard, a 12-month suspension or revocation of your drivers license, and a fine of up to $1,000.    Temporary Disabled Parking Placard Applications -- May be taken to any  facility or mailed in.  Permanent Disables Parking Placard Applications -- MUST be mailed to the following address:  , Persons with Disabilities Placard Unit, [address] [place] [address], [address], [place] [address].    *If you have a permanent disability placard and would like a Persons with Disabilities License Plate, please visit your local  facility to apply. You will need your permanent placard number and current plate number or KARINA.     Please complete Page 2 to ensure timely processing.    Printed by authority of the [facility]. July 2021 -- 1 -- VSD 62.28          Part 3: Medical Eligibility Standards and Medical Professionals Certification  As the medical  professional(s) executing this document and verifying the nature of the applicant's disability, I understand that making a false representation of a person's disability for the purposes of obtaining any type of a disabled parking placard may result in suspension or revocation of my license and a fine of up to $1,000. As a licensed physician, advanced practice nurse, optometrist, chiropractor or physician's assistant, I certify the applicant has a condition that constitutes him/her as a person with disabilities.   Length of Disability: (Check one)   [x]   Temporary Disability; the duration of this disability is _____6 months________________ (maximum 6 months)  []   Permanent Disability  []   Meter-Exempt Disability (Must complete and sign Part 4 also.)  Check all that apply (MUST check at least one):  []  Is restricted by a lung disease to such a degree that the person’s forced (respiratory) expiratory volume (FEV) for 1 second, when measured by spirometry, is less than 1 liter.  []   Uses a portable oxygen device.  []   Has a Class III or Class IV cardiac condition according to the standards set by the American Heart Association.  [x]   Cannot walk without the use of or assistance from a wheelchair, a walker, a crutch, a brace, a prosthetic device or another person.  [x]   Is severely limited in the ability to walk due to an arthritic, neurological, oncological or orthopedic condition.  [x]   Cannot walk 200 feet without stopping to rest because of one of the above five conditions.  Check all that apply: (MUST check at least one diagnosis):  []Amputation of extremity(s)_________________ [x] Arthritis of the ____hip __________________  []Spina Bifida     []Osteoarthritis of the __________________   []Multiple Sclerosis    [] Chronic Pain due to ___________________  []Quadriplegia/Paraplegia   [] Legally Blind with limited mobility  [] Cerebral Palsy  [x] Other Diagnosis: __Chronic right knee pain.  Right knee  instability.  _______________________________________________________________    If none of the above conditions apply, list the medical condition that impacts the person’s mobility.     Medical Professional’s Printed Name*   Patricia Zavala DO Specialty  Family Medicine   Office Address   [facility], [address]  [facility] [address]  Dept: [phone]  Dept Fax: [phone]   Medical Professional’s Signature   State Professional License Number (NOT NPI#)   [id number] Today’s Date                  3/15/2024    Signature of Collaborating/Supervising Physician (if signed above by resident/assistant) Supervising State Professional License Number             PART 4: Medical Eligibility for Meter-Exempt Parking  The meter-exempt parking certification must be completed only when the applicant qualifies. To qualify, the applicant MUST have a VALID  [place] 's license, have an ambulatory disability described in Part 3, and also have one of the following conditions listed below.  Economic need is not a consideration for meter-exempt parking    The applicant is eligible for meter-exempt parking as provided by statue due to the following PERMANENT medical condition or disability:    Check all that apply:  [] Cannot manage, manipulate, or insert coins, or obtain tickets in parking meters/ticket machines due to lack of fine motor control of BOTH hands.  [] Cannot reach above his/her head to a height of 42 inches from the ground due to a lack of finger, hand or upper-extremity strength or mobility.  [] Cannot approach a parking meter due to his/her use of a wheelchair or other device for mobility.  [] Cannot walk more than 20 feet due to an orthopedic, neurological, cardiovascular, or lung condition in which the degree of debilitation is so severe that it almost completely impedes the ability to walk.  [] Missing a hand(s) or arm(s) or has permanently lost the  use of a hand or arm.  [] Patient is under 18 years of age and incapable of driving.     Medical Professional’s Signature State Professional License Number (NOT NPI#)   Today’s Date                  3/15/2024    Signature of Collaborating/Supervising Physician (if signed above by resident/assistant) Supervising State Professional License Number     FOR  OFFICE USE ONLY     Parking Placard Number:  Expiration Date:   Issued By: Issued Date: